# Patient Record
Sex: MALE | Race: WHITE | NOT HISPANIC OR LATINO | Employment: UNEMPLOYED | URBAN - METROPOLITAN AREA
[De-identification: names, ages, dates, MRNs, and addresses within clinical notes are randomized per-mention and may not be internally consistent; named-entity substitution may affect disease eponyms.]

---

## 2023-12-29 ENCOUNTER — APPOINTMENT (OUTPATIENT)
Dept: CT IMAGING | Facility: HOSPITAL | Age: 68
End: 2023-12-29
Payer: MEDICARE

## 2023-12-29 ENCOUNTER — APPOINTMENT (OUTPATIENT)
Dept: GENERAL RADIOLOGY | Facility: HOSPITAL | Age: 68
End: 2023-12-29
Payer: MEDICARE

## 2023-12-29 ENCOUNTER — HOSPITAL ENCOUNTER (INPATIENT)
Facility: HOSPITAL | Age: 68
LOS: 5 days | Discharge: HOME | End: 2024-01-04
Attending: EMERGENCY MEDICINE | Admitting: INTERNAL MEDICINE
Payer: MEDICARE

## 2023-12-29 DIAGNOSIS — E83.42 HYPOMAGNESEMIA: ICD-10-CM

## 2023-12-29 DIAGNOSIS — N50.89 SCROTAL EDEMA: ICD-10-CM

## 2023-12-29 DIAGNOSIS — R09.02 HYPOXIA: ICD-10-CM

## 2023-12-29 DIAGNOSIS — Z86.39 HISTORY OF OBESITY: ICD-10-CM

## 2023-12-29 DIAGNOSIS — R06.09 EXERTIONAL DYSPNEA: ICD-10-CM

## 2023-12-29 DIAGNOSIS — I10 ELEVATED BLOOD PRESSURE READING WITH DIAGNOSIS OF HYPERTENSION: ICD-10-CM

## 2023-12-29 DIAGNOSIS — Z91.199 H/O NONCOMPLIANCE WITH MEDICAL TREATMENT, PRESENTING HAZARDS TO HEALTH: ICD-10-CM

## 2023-12-29 DIAGNOSIS — E87.6 HYPOKALEMIA: ICD-10-CM

## 2023-12-29 DIAGNOSIS — I50.9 ACUTE ON CHRONIC CONGESTIVE HEART FAILURE, UNSPECIFIED HEART FAILURE TYPE: Primary | ICD-10-CM

## 2023-12-29 DIAGNOSIS — R60.0 PEDAL EDEMA: ICD-10-CM

## 2023-12-29 PROBLEM — E87.70 FLUID OVERLOAD, UNSPECIFIED: Status: ACTIVE | Noted: 2023-12-29

## 2023-12-29 LAB
ALBUMIN SERPL-MCNC: 3 G/DL (ref 3.5–5.2)
ALBUMIN/GLOB SERPL: 0.8 G/DL
ALP SERPL-CCNC: 103 U/L (ref 39–117)
ALT SERPL W P-5'-P-CCNC: 10 U/L (ref 1–41)
AMMONIA BLD-SCNC: 30 UMOL/L (ref 16–60)
AMPHET+METHAMPHET UR QL: NEGATIVE
AMPHETAMINES UR QL: NEGATIVE
ANION GAP SERPL CALCULATED.3IONS-SCNC: 8 MMOL/L (ref 5–15)
ARTERIAL PATENCY WRIST A: ABNORMAL
AST SERPL-CCNC: 27 U/L (ref 1–40)
ATMOSPHERIC PRESS: ABNORMAL MM[HG]
BARBITURATES UR QL SCN: NEGATIVE
BASE EXCESS BLDA CALC-SCNC: 10.5 MMOL/L (ref 0–2)
BASOPHILS # BLD AUTO: 0.05 10*3/MM3 (ref 0–0.2)
BASOPHILS NFR BLD AUTO: 0.4 % (ref 0–1.5)
BDY SITE: ABNORMAL
BENZODIAZ UR QL SCN: NEGATIVE
BILIRUB SERPL-MCNC: 1.1 MG/DL (ref 0–1.2)
BILIRUB UR QL STRIP: NEGATIVE
BODY TEMPERATURE: 37
BUN SERPL-MCNC: 11 MG/DL (ref 8–23)
BUN/CREAT SERPL: 12.2 (ref 7–25)
BUPRENORPHINE SERPL-MCNC: NEGATIVE NG/ML
CALCIUM SPEC-SCNC: 8.7 MG/DL (ref 8.6–10.5)
CANNABINOIDS SERPL QL: NEGATIVE
CHLORIDE SERPL-SCNC: 94 MMOL/L (ref 98–107)
CLARITY UR: CLEAR
CO2 BLDA-SCNC: 37.7 MMOL/L (ref 22–33)
CO2 SERPL-SCNC: 33 MMOL/L (ref 22–29)
COCAINE UR QL: NEGATIVE
COHGB MFR BLD: 4.3 % (ref 0–2)
COLOR UR: YELLOW
CREAT SERPL-MCNC: 0.9 MG/DL (ref 0.76–1.27)
D-LACTATE SERPL-SCNC: 1.6 MMOL/L (ref 0.5–2)
DEPRECATED RDW RBC AUTO: 44 FL (ref 37–54)
EGFRCR SERPLBLD CKD-EPI 2021: 93 ML/MIN/1.73
EOSINOPHIL # BLD AUTO: 0.32 10*3/MM3 (ref 0–0.4)
EOSINOPHIL NFR BLD AUTO: 2.4 % (ref 0.3–6.2)
EPAP: 0
ERYTHROCYTE [DISTWIDTH] IN BLOOD BY AUTOMATED COUNT: 13.7 % (ref 12.3–15.4)
FENTANYL UR-MCNC: NEGATIVE NG/ML
FLUAV RNA RESP QL NAA+PROBE: NOT DETECTED
FLUBV RNA RESP QL NAA+PROBE: NOT DETECTED
GLOBULIN UR ELPH-MCNC: 3.6 GM/DL
GLUCOSE SERPL-MCNC: 121 MG/DL (ref 65–99)
GLUCOSE UR STRIP-MCNC: NEGATIVE MG/DL
HCO3 BLDA-SCNC: 36.2 MMOL/L (ref 20–26)
HCT VFR BLD AUTO: 43.4 % (ref 37.5–51)
HCT VFR BLD CALC: 41.3 % (ref 38–51)
HGB BLD-MCNC: 14.3 G/DL (ref 13–17.7)
HGB BLDA-MCNC: 13.5 G/DL (ref 13.5–17.5)
HGB UR QL STRIP.AUTO: ABNORMAL
IMM GRANULOCYTES # BLD AUTO: 0.07 10*3/MM3 (ref 0–0.05)
IMM GRANULOCYTES NFR BLD AUTO: 0.5 % (ref 0–0.5)
INHALED O2 CONCENTRATION: 28 %
IPAP: 0
KETONES UR QL STRIP: NEGATIVE
LEUKOCYTE ESTERASE UR QL STRIP.AUTO: NEGATIVE
LYMPHOCYTES # BLD AUTO: 1.14 10*3/MM3 (ref 0.7–3.1)
LYMPHOCYTES NFR BLD AUTO: 8.6 % (ref 19.6–45.3)
MAGNESIUM SERPL-MCNC: 1.4 MG/DL (ref 1.6–2.4)
MCH RBC QN AUTO: 28.8 PG (ref 26.6–33)
MCHC RBC AUTO-ENTMCNC: 32.9 G/DL (ref 31.5–35.7)
MCV RBC AUTO: 87.5 FL (ref 79–97)
METHADONE UR QL SCN: NEGATIVE
METHGB BLD QL: 0.1 % (ref 0–1.5)
MODALITY: ABNORMAL
MONOCYTES # BLD AUTO: 0.85 10*3/MM3 (ref 0.1–0.9)
MONOCYTES NFR BLD AUTO: 6.4 % (ref 5–12)
NEUTROPHILS NFR BLD AUTO: 10.89 10*3/MM3 (ref 1.7–7)
NEUTROPHILS NFR BLD AUTO: 81.7 % (ref 42.7–76)
NITRITE UR QL STRIP: NEGATIVE
NRBC BLD AUTO-RTO: 0 /100 WBC (ref 0–0.2)
NT-PROBNP SERPL-MCNC: 2572 PG/ML (ref 0–900)
OPIATES UR QL: NEGATIVE
OXYCODONE UR QL SCN: NEGATIVE
OXYHGB MFR BLDV: 93.1 % (ref 94–99)
PAW @ PEAK INSP FLOW SETTING VENT: 0 CMH2O
PCO2 BLDA: 51 MM HG (ref 35–45)
PCO2 TEMP ADJ BLD: 51 MM HG (ref 35–48)
PCP UR QL SCN: NEGATIVE
PH BLDA: 7.46 PH UNITS (ref 7.35–7.45)
PH UR STRIP.AUTO: 7 [PH] (ref 5–8)
PH, TEMP CORRECTED: 7.46 PH UNITS
PLATELET # BLD AUTO: 326 10*3/MM3 (ref 140–450)
PMV BLD AUTO: 9.1 FL (ref 6–12)
PO2 BLDA: 85.8 MM HG (ref 83–108)
PO2 TEMP ADJ BLD: 85.8 MM HG (ref 83–108)
POTASSIUM SERPL-SCNC: 2.9 MMOL/L (ref 3.5–5.2)
PROCALCITONIN SERPL-MCNC: 0.07 NG/ML (ref 0–0.25)
PROT SERPL-MCNC: 6.6 G/DL (ref 6–8.5)
PROT UR QL STRIP: ABNORMAL
RBC # BLD AUTO: 4.96 10*6/MM3 (ref 4.14–5.8)
SARS-COV-2 RNA RESP QL NAA+PROBE: NOT DETECTED
SODIUM SERPL-SCNC: 135 MMOL/L (ref 136–145)
SP GR UR STRIP: 1.01 (ref 1–1.03)
TOTAL RATE: 0 BREATHS/MINUTE
TRICYCLICS UR QL SCN: NEGATIVE
TROPONIN T SERPL HS-MCNC: 31 NG/L
UROBILINOGEN UR QL STRIP: ABNORMAL
WBC NRBC COR # BLD AUTO: 13.32 10*3/MM3 (ref 3.4–10.8)

## 2023-12-29 PROCEDURE — 83605 ASSAY OF LACTIC ACID: CPT | Performed by: PHYSICIAN ASSISTANT

## 2023-12-29 PROCEDURE — 25010000002 FUROSEMIDE PER 20 MG: Performed by: PHYSICIAN ASSISTANT

## 2023-12-29 PROCEDURE — 83880 ASSAY OF NATRIURETIC PEPTIDE: CPT | Performed by: PHYSICIAN ASSISTANT

## 2023-12-29 PROCEDURE — 70450 CT HEAD/BRAIN W/O DYE: CPT

## 2023-12-29 PROCEDURE — 82140 ASSAY OF AMMONIA: CPT | Performed by: PHYSICIAN ASSISTANT

## 2023-12-29 PROCEDURE — 80307 DRUG TEST PRSMV CHEM ANLYZR: CPT | Performed by: PHYSICIAN ASSISTANT

## 2023-12-29 PROCEDURE — 87636 SARSCOV2 & INF A&B AMP PRB: CPT | Performed by: PHYSICIAN ASSISTANT

## 2023-12-29 PROCEDURE — 82805 BLOOD GASES W/O2 SATURATION: CPT

## 2023-12-29 PROCEDURE — 85025 COMPLETE CBC W/AUTO DIFF WBC: CPT | Performed by: PHYSICIAN ASSISTANT

## 2023-12-29 PROCEDURE — 83735 ASSAY OF MAGNESIUM: CPT | Performed by: PHYSICIAN ASSISTANT

## 2023-12-29 PROCEDURE — 83050 HGB METHEMOGLOBIN QUAN: CPT

## 2023-12-29 PROCEDURE — G0378 HOSPITAL OBSERVATION PER HR: HCPCS

## 2023-12-29 PROCEDURE — 25010000002 MAGNESIUM SULFATE 2 GM/50ML SOLUTION: Performed by: EMERGENCY MEDICINE

## 2023-12-29 PROCEDURE — 84484 ASSAY OF TROPONIN QUANT: CPT | Performed by: PHYSICIAN ASSISTANT

## 2023-12-29 PROCEDURE — 74176 CT ABD & PELVIS W/O CONTRAST: CPT

## 2023-12-29 PROCEDURE — 36600 WITHDRAWAL OF ARTERIAL BLOOD: CPT

## 2023-12-29 PROCEDURE — 84145 PROCALCITONIN (PCT): CPT | Performed by: PHYSICIAN ASSISTANT

## 2023-12-29 PROCEDURE — 80053 COMPREHEN METABOLIC PANEL: CPT | Performed by: PHYSICIAN ASSISTANT

## 2023-12-29 PROCEDURE — 93005 ELECTROCARDIOGRAM TRACING: CPT | Performed by: PHYSICIAN ASSISTANT

## 2023-12-29 PROCEDURE — 82375 ASSAY CARBOXYHB QUANT: CPT

## 2023-12-29 PROCEDURE — 99285 EMERGENCY DEPT VISIT HI MDM: CPT

## 2023-12-29 PROCEDURE — 81001 URINALYSIS AUTO W/SCOPE: CPT | Performed by: PHYSICIAN ASSISTANT

## 2023-12-29 PROCEDURE — 71045 X-RAY EXAM CHEST 1 VIEW: CPT

## 2023-12-29 RX ORDER — SODIUM CHLORIDE 0.9 % (FLUSH) 0.9 %
10 SYRINGE (ML) INJECTION EVERY 12 HOURS SCHEDULED
Status: DISCONTINUED | OUTPATIENT
Start: 2023-12-29 | End: 2024-01-04 | Stop reason: HOSPADM

## 2023-12-29 RX ORDER — NITROGLYCERIN 0.4 MG/1
0.4 TABLET SUBLINGUAL
Status: DISCONTINUED | OUTPATIENT
Start: 2023-12-29 | End: 2024-01-04 | Stop reason: HOSPADM

## 2023-12-29 RX ORDER — MAGNESIUM SULFATE HEPTAHYDRATE 40 MG/ML
2 INJECTION, SOLUTION INTRAVENOUS
Status: COMPLETED | OUTPATIENT
Start: 2023-12-29 | End: 2023-12-30

## 2023-12-29 RX ORDER — HEPARIN SODIUM 5000 [USP'U]/ML
5000 INJECTION, SOLUTION INTRAVENOUS; SUBCUTANEOUS EVERY 8 HOURS SCHEDULED
Status: DISCONTINUED | OUTPATIENT
Start: 2023-12-29 | End: 2024-01-04 | Stop reason: HOSPADM

## 2023-12-29 RX ORDER — VANCOMYCIN HYDROCHLORIDE 1 G/200ML
7.6 INJECTION, SOLUTION INTRAVENOUS EVERY 12 HOURS
Status: DISCONTINUED | OUTPATIENT
Start: 2023-12-30 | End: 2023-12-29

## 2023-12-29 RX ORDER — SODIUM CHLORIDE 9 MG/ML
40 INJECTION, SOLUTION INTRAVENOUS AS NEEDED
Status: DISCONTINUED | OUTPATIENT
Start: 2023-12-29 | End: 2024-01-04 | Stop reason: HOSPADM

## 2023-12-29 RX ORDER — TRAMADOL HYDROCHLORIDE 50 MG/1
50 TABLET ORAL EVERY 6 HOURS PRN
Status: DISCONTINUED | OUTPATIENT
Start: 2023-12-29 | End: 2024-01-04 | Stop reason: HOSPADM

## 2023-12-29 RX ORDER — POTASSIUM CHLORIDE 20 MEQ/1
40 TABLET, EXTENDED RELEASE ORAL EVERY 4 HOURS
Status: COMPLETED | OUTPATIENT
Start: 2023-12-29 | End: 2023-12-30

## 2023-12-29 RX ORDER — MORPHINE SULFATE 2 MG/ML
1 INJECTION, SOLUTION INTRAMUSCULAR; INTRAVENOUS EVERY 4 HOURS PRN
Status: DISCONTINUED | OUTPATIENT
Start: 2023-12-29 | End: 2023-12-30

## 2023-12-29 RX ORDER — VANCOMYCIN HYDROCHLORIDE 1 G/200ML
7.6 INJECTION, SOLUTION INTRAVENOUS EVERY 12 HOURS
Status: DISCONTINUED | OUTPATIENT
Start: 2023-12-30 | End: 2023-12-30

## 2023-12-29 RX ORDER — NALOXONE HCL 0.4 MG/ML
0.4 VIAL (ML) INJECTION
Status: DISCONTINUED | OUTPATIENT
Start: 2023-12-29 | End: 2023-12-30

## 2023-12-29 RX ORDER — FUROSEMIDE 10 MG/ML
80 INJECTION INTRAMUSCULAR; INTRAVENOUS ONCE
Status: COMPLETED | OUTPATIENT
Start: 2023-12-29 | End: 2023-12-29

## 2023-12-29 RX ORDER — FUROSEMIDE 10 MG/ML
20 INJECTION INTRAMUSCULAR; INTRAVENOUS EVERY 12 HOURS
Status: DISCONTINUED | OUTPATIENT
Start: 2023-12-30 | End: 2024-01-04 | Stop reason: HOSPADM

## 2023-12-29 RX ORDER — SODIUM CHLORIDE 0.9 % (FLUSH) 0.9 %
10 SYRINGE (ML) INJECTION AS NEEDED
Status: DISCONTINUED | OUTPATIENT
Start: 2023-12-29 | End: 2024-01-04 | Stop reason: HOSPADM

## 2023-12-29 RX ORDER — ONDANSETRON 2 MG/ML
4 INJECTION INTRAMUSCULAR; INTRAVENOUS EVERY 6 HOURS PRN
Status: DISCONTINUED | OUTPATIENT
Start: 2023-12-29 | End: 2024-01-04 | Stop reason: HOSPADM

## 2023-12-29 RX ADMIN — FUROSEMIDE 80 MG: 10 INJECTION, SOLUTION INTRAMUSCULAR; INTRAVENOUS at 21:54

## 2023-12-29 RX ADMIN — MAGNESIUM SULFATE HEPTAHYDRATE 2 G: 2 INJECTION, SOLUTION INTRAVENOUS at 21:59

## 2023-12-29 RX ADMIN — NITROGLYCERIN 1 INCH: 20 OINTMENT TOPICAL at 22:52

## 2023-12-29 RX ADMIN — POTASSIUM CHLORIDE 40 MEQ: 1500 TABLET, EXTENDED RELEASE ORAL at 22:52

## 2023-12-29 NOTE — Clinical Note
Level of Care: Telemetry [5]   Diagnosis: Fluid overload, unspecified [[id number]]   Admitting Physician: KEMI DILLARD III [[id number]]   Attending Physician: KEMI DILLARD III [[id number]]   Bed Request Comments: tele obs (not CDU)

## 2023-12-30 ENCOUNTER — APPOINTMENT (OUTPATIENT)
Dept: CARDIOLOGY | Facility: HOSPITAL | Age: 68
End: 2023-12-30
Payer: MEDICARE

## 2023-12-30 PROBLEM — R18.8 ASCITES: Chronic | Status: ACTIVE | Noted: 2023-12-30

## 2023-12-30 PROBLEM — K76.9 LIVER DISEASE: Chronic | Status: ACTIVE | Noted: 2023-12-30

## 2023-12-30 PROBLEM — J96.01 ACUTE RESPIRATORY FAILURE WITH HYPOXIA: Status: ACTIVE | Noted: 2023-12-30

## 2023-12-30 PROBLEM — I16.0 HYPERTENSIVE URGENCY: Status: ACTIVE | Noted: 2023-12-30

## 2023-12-30 PROBLEM — L03.90 CELLULITIS: Status: ACTIVE | Noted: 2023-12-30

## 2023-12-30 LAB
ALBUMIN SERPL-MCNC: 2.5 G/DL (ref 3.5–5.2)
ALBUMIN/GLOB SERPL: 0.9 G/DL
ALP SERPL-CCNC: 81 U/L (ref 39–117)
ALT SERPL W P-5'-P-CCNC: 9 U/L (ref 1–41)
ANION GAP SERPL CALCULATED.3IONS-SCNC: 9 MMOL/L (ref 5–15)
AST SERPL-CCNC: 27 U/L (ref 1–40)
BACTERIA UR QL AUTO: NORMAL /HPF
BASOPHILS # BLD AUTO: 0.06 10*3/MM3 (ref 0–0.2)
BASOPHILS NFR BLD AUTO: 0.6 % (ref 0–1.5)
BH CV ECHO MEAS - AO MAX PG: 18.5 MMHG
BH CV ECHO MEAS - AO MEAN PG: 10.5 MMHG
BH CV ECHO MEAS - AO ROOT DIAM: 3.2 CM
BH CV ECHO MEAS - AO V2 MAX: 214.5 CM/SEC
BH CV ECHO MEAS - AO V2 VTI: 52.9 CM
BH CV ECHO MEAS - AVA(I,D): 2.19 CM2
BH CV ECHO MEAS - EDV(CUBED): 132.7 ML
BH CV ECHO MEAS - EDV(MOD-SP2): 165 ML
BH CV ECHO MEAS - EDV(MOD-SP4): 131 ML
BH CV ECHO MEAS - EF(MOD-SP2): 69.8 %
BH CV ECHO MEAS - EF(MOD-SP4): 56.6 %
BH CV ECHO MEAS - ESV(CUBED): 24.4 ML
BH CV ECHO MEAS - ESV(MOD-SP2): 49.9 ML
BH CV ECHO MEAS - ESV(MOD-SP4): 56.9 ML
BH CV ECHO MEAS - FS: 43.1 %
BH CV ECHO MEAS - IVS/LVPW: 1 CM
BH CV ECHO MEAS - IVSD: 1.4 CM
BH CV ECHO MEAS - LA DIMENSION: 4.8 CM
BH CV ECHO MEAS - LAT PEAK E' VEL: 11.3 CM/SEC
BH CV ECHO MEAS - LV MASS(C)D: 300.4 GRAMS
BH CV ECHO MEAS - LV MAX PG: 6.6 MMHG
BH CV ECHO MEAS - LV MEAN PG: 4 MMHG
BH CV ECHO MEAS - LV V1 MAX: 128 CM/SEC
BH CV ECHO MEAS - LV V1 VTI: 33.5 CM
BH CV ECHO MEAS - LVIDD: 5.1 CM
BH CV ECHO MEAS - LVIDS: 2.9 CM
BH CV ECHO MEAS - LVOT AREA: 3.5 CM2
BH CV ECHO MEAS - LVOT DIAM: 2.1 CM
BH CV ECHO MEAS - LVPWD: 1.4 CM
BH CV ECHO MEAS - MED PEAK E' VEL: 8.5 CM/SEC
BH CV ECHO MEAS - MV A MAX VEL: 62.4 CM/SEC
BH CV ECHO MEAS - MV DEC SLOPE: 359 CM/SEC2
BH CV ECHO MEAS - MV DEC TIME: 0.28 SEC
BH CV ECHO MEAS - MV E MAX VEL: 100 CM/SEC
BH CV ECHO MEAS - MV E/A: 1.6
BH CV ECHO MEAS - MV MAX PG: 9.9 MMHG
BH CV ECHO MEAS - MV MEAN PG: 2 MMHG
BH CV ECHO MEAS - MV V2 VTI: 36.8 CM
BH CV ECHO MEAS - MVA(VTI): 3.2 CM2
BH CV ECHO MEAS - PA ACC TIME: 0.15 SEC
BH CV ECHO MEAS - PA V2 MAX: 95.1 CM/SEC
BH CV ECHO MEAS - RAP SYSTOLE: 8 MMHG
BH CV ECHO MEAS - RVSP: 64 MMHG
BH CV ECHO MEAS - SV(LVOT): 116 ML
BH CV ECHO MEAS - SV(MOD-SP2): 115.1 ML
BH CV ECHO MEAS - SV(MOD-SP4): 74.1 ML
BH CV ECHO MEAS - TAPSE (>1.6): 2.8 CM
BH CV ECHO MEAS - TR MAX PG: 56.3 MMHG
BH CV ECHO MEAS - TR MAX VEL: 375 CM/SEC
BH CV ECHO MEASUREMENTS AVERAGE E/E' RATIO: 10.1
BH CV VAS BP RIGHT ARM: NORMAL MMHG
BH CV XLRA - RV BASE: 4.3 CM
BH CV XLRA - RV LENGTH: 7.2 CM
BH CV XLRA - RV MID: 4.1 CM
BH CV XLRA - TDI S': 15.7 CM/SEC
BILIRUB SERPL-MCNC: 0.9 MG/DL (ref 0–1.2)
BUN SERPL-MCNC: 10 MG/DL (ref 8–23)
BUN/CREAT SERPL: 11.8 (ref 7–25)
CALCIUM SPEC-SCNC: 8 MG/DL (ref 8.6–10.5)
CHLORIDE SERPL-SCNC: 97 MMOL/L (ref 98–107)
CHOLEST SERPL-MCNC: 107 MG/DL (ref 0–200)
CO2 SERPL-SCNC: 33 MMOL/L (ref 22–29)
CREAT SERPL-MCNC: 0.85 MG/DL (ref 0.76–1.27)
DEPRECATED RDW RBC AUTO: 43.3 FL (ref 37–54)
EGFRCR SERPLBLD CKD-EPI 2021: 94.6 ML/MIN/1.73
EOSINOPHIL # BLD AUTO: 0.35 10*3/MM3 (ref 0–0.4)
EOSINOPHIL NFR BLD AUTO: 3.5 % (ref 0.3–6.2)
ERYTHROCYTE [DISTWIDTH] IN BLOOD BY AUTOMATED COUNT: 13.8 % (ref 12.3–15.4)
GEN 5 2HR TROPONIN T REFLEX: 40 NG/L
GLOBULIN UR ELPH-MCNC: 2.9 GM/DL
GLUCOSE SERPL-MCNC: 106 MG/DL (ref 65–99)
HBA1C MFR BLD: 5.1 % (ref 4.8–5.6)
HCT VFR BLD AUTO: 36.8 % (ref 37.5–51)
HDLC SERPL-MCNC: 47 MG/DL (ref 40–60)
HGB BLD-MCNC: 12.2 G/DL (ref 13–17.7)
HYALINE CASTS UR QL AUTO: NORMAL /LPF
IMM GRANULOCYTES # BLD AUTO: 0.05 10*3/MM3 (ref 0–0.05)
IMM GRANULOCYTES NFR BLD AUTO: 0.5 % (ref 0–0.5)
INR PPP: 1.1 (ref 0.89–1.12)
LDLC SERPL CALC-MCNC: 47 MG/DL (ref 0–100)
LDLC/HDLC SERPL: 1.03 {RATIO}
LEFT ATRIUM VOLUME INDEX: 37.6 ML/M2
LYMPHOCYTES # BLD AUTO: 1.39 10*3/MM3 (ref 0.7–3.1)
LYMPHOCYTES NFR BLD AUTO: 13.8 % (ref 19.6–45.3)
MAGNESIUM SERPL-MCNC: 2.2 MG/DL (ref 1.6–2.4)
MCH RBC QN AUTO: 28.5 PG (ref 26.6–33)
MCHC RBC AUTO-ENTMCNC: 33.2 G/DL (ref 31.5–35.7)
MCV RBC AUTO: 86 FL (ref 79–97)
MONOCYTES # BLD AUTO: 0.74 10*3/MM3 (ref 0.1–0.9)
MONOCYTES NFR BLD AUTO: 7.4 % (ref 5–12)
MRSA DNA SPEC QL NAA+PROBE: NEGATIVE
NEUTROPHILS NFR BLD AUTO: 7.46 10*3/MM3 (ref 1.7–7)
NEUTROPHILS NFR BLD AUTO: 74.2 % (ref 42.7–76)
NRBC BLD AUTO-RTO: 0 /100 WBC (ref 0–0.2)
PLATELET # BLD AUTO: 301 10*3/MM3 (ref 140–450)
PMV BLD AUTO: 9.8 FL (ref 6–12)
POTASSIUM SERPL-SCNC: 3.1 MMOL/L (ref 3.5–5.2)
POTASSIUM SERPL-SCNC: 3.1 MMOL/L (ref 3.5–5.2)
POTASSIUM SERPL-SCNC: 3.4 MMOL/L (ref 3.5–5.2)
PROT SERPL-MCNC: 5.4 G/DL (ref 6–8.5)
PROTHROMBIN TIME: 14.3 SECONDS (ref 12.2–14.5)
QT INTERVAL: 414 MS
QT INTERVAL: 504 MS
QTC INTERVAL: 509 MS
QTC INTERVAL: 574 MS
RBC # BLD AUTO: 4.28 10*6/MM3 (ref 4.14–5.8)
RBC # UR STRIP: NORMAL /HPF
REF LAB TEST METHOD: NORMAL
SODIUM SERPL-SCNC: 139 MMOL/L (ref 136–145)
SQUAMOUS #/AREA URNS HPF: NORMAL /HPF
TRIGL SERPL-MCNC: 59 MG/DL (ref 0–150)
TROPONIN T DELTA: 5 NG/L
TROPONIN T SERPL HS-MCNC: 35 NG/L
TSH SERPL DL<=0.05 MIU/L-ACNC: 2.36 UIU/ML (ref 0.27–4.2)
VLDLC SERPL-MCNC: 13 MG/DL (ref 5–40)
WBC # UR STRIP: NORMAL /HPF
WBC NRBC COR # BLD AUTO: 10.05 10*3/MM3 (ref 3.4–10.8)

## 2023-12-30 PROCEDURE — 25010000002 MAGNESIUM SULFATE 2 GM/50ML SOLUTION: Performed by: EMERGENCY MEDICINE

## 2023-12-30 PROCEDURE — 80061 LIPID PANEL: CPT | Performed by: INTERNAL MEDICINE

## 2023-12-30 PROCEDURE — 85610 PROTHROMBIN TIME: CPT | Performed by: NURSE PRACTITIONER

## 2023-12-30 PROCEDURE — 93306 TTE W/DOPPLER COMPLETE: CPT

## 2023-12-30 PROCEDURE — 25010000002 PIPERACILLIN SOD-TAZOBACTAM PER 1 G: Performed by: INTERNAL MEDICINE

## 2023-12-30 PROCEDURE — 87641 MR-STAPH DNA AMP PROBE: CPT

## 2023-12-30 PROCEDURE — 87040 BLOOD CULTURE FOR BACTERIA: CPT | Performed by: INTERNAL MEDICINE

## 2023-12-30 PROCEDURE — 25010000002 PIPERACILLIN SOD-TAZOBACTAM PER 1 G

## 2023-12-30 PROCEDURE — 83036 HEMOGLOBIN GLYCOSYLATED A1C: CPT | Performed by: INTERNAL MEDICINE

## 2023-12-30 PROCEDURE — 93306 TTE W/DOPPLER COMPLETE: CPT | Performed by: INTERNAL MEDICINE

## 2023-12-30 PROCEDURE — 99223 1ST HOSP IP/OBS HIGH 75: CPT | Performed by: NURSE PRACTITIONER

## 2023-12-30 PROCEDURE — 25010000002 CEFAZOLIN PER 500 MG: Performed by: FAMILY MEDICINE

## 2023-12-30 PROCEDURE — 85025 COMPLETE CBC W/AUTO DIFF WBC: CPT | Performed by: INTERNAL MEDICINE

## 2023-12-30 PROCEDURE — 84484 ASSAY OF TROPONIN QUANT: CPT | Performed by: INTERNAL MEDICINE

## 2023-12-30 PROCEDURE — 25810000003 SODIUM CHLORIDE 0.9 % SOLUTION

## 2023-12-30 PROCEDURE — 84403 ASSAY OF TOTAL TESTOSTERONE: CPT | Performed by: NURSE PRACTITIONER

## 2023-12-30 PROCEDURE — 25010000002 THIAMINE PER 100 MG: Performed by: NURSE PRACTITIONER

## 2023-12-30 PROCEDURE — 80053 COMPREHEN METABOLIC PANEL: CPT | Performed by: INTERNAL MEDICINE

## 2023-12-30 PROCEDURE — 83735 ASSAY OF MAGNESIUM: CPT | Performed by: INTERNAL MEDICINE

## 2023-12-30 PROCEDURE — 84443 ASSAY THYROID STIM HORMONE: CPT | Performed by: INTERNAL MEDICINE

## 2023-12-30 PROCEDURE — 84132 ASSAY OF SERUM POTASSIUM: CPT | Performed by: EMERGENCY MEDICINE

## 2023-12-30 PROCEDURE — 93005 ELECTROCARDIOGRAM TRACING: CPT | Performed by: FAMILY MEDICINE

## 2023-12-30 PROCEDURE — 84132 ASSAY OF SERUM POTASSIUM: CPT | Performed by: FAMILY MEDICINE

## 2023-12-30 PROCEDURE — 25010000002 HEPARIN (PORCINE) PER 1000 UNITS: Performed by: INTERNAL MEDICINE

## 2023-12-30 PROCEDURE — 25010000002 VANCOMYCIN 10 G RECONSTITUTED SOLUTION

## 2023-12-30 PROCEDURE — 93010 ELECTROCARDIOGRAM REPORT: CPT | Performed by: INTERNAL MEDICINE

## 2023-12-30 PROCEDURE — 25010000002 FUROSEMIDE PER 20 MG: Performed by: INTERNAL MEDICINE

## 2023-12-30 RX ORDER — FOLIC ACID 1 MG/1
1 TABLET ORAL DAILY
Status: DISCONTINUED | OUTPATIENT
Start: 2023-12-30 | End: 2024-01-04 | Stop reason: HOSPADM

## 2023-12-30 RX ORDER — POTASSIUM CHLORIDE 20 MEQ/1
40 TABLET, EXTENDED RELEASE ORAL EVERY 4 HOURS
Status: COMPLETED | OUTPATIENT
Start: 2023-12-30 | End: 2023-12-30

## 2023-12-30 RX ORDER — NICOTINE 21 MG/24HR
1 PATCH, TRANSDERMAL 24 HOURS TRANSDERMAL
Status: DISCONTINUED | OUTPATIENT
Start: 2023-12-30 | End: 2024-01-04 | Stop reason: HOSPADM

## 2023-12-30 RX ORDER — MULTIPLE VITAMINS W/ MINERALS TAB 9MG-400MCG
1 TAB ORAL DAILY
Status: DISCONTINUED | OUTPATIENT
Start: 2023-12-30 | End: 2024-01-04 | Stop reason: HOSPADM

## 2023-12-30 RX ORDER — THIAMINE HYDROCHLORIDE 100 MG/ML
100 INJECTION, SOLUTION INTRAMUSCULAR; INTRAVENOUS DAILY
Status: DISCONTINUED | OUTPATIENT
Start: 2023-12-30 | End: 2024-01-04

## 2023-12-30 RX ADMIN — Medication 1 TABLET: at 19:46

## 2023-12-30 RX ADMIN — HEPARIN SODIUM 5000 UNITS: 5000 INJECTION INTRAVENOUS; SUBCUTANEOUS at 05:21

## 2023-12-30 RX ADMIN — SODIUM CHLORIDE 2000 MG: 900 INJECTION INTRAVENOUS at 14:53

## 2023-12-30 RX ADMIN — HEPARIN SODIUM 5000 UNITS: 5000 INJECTION INTRAVENOUS; SUBCUTANEOUS at 14:53

## 2023-12-30 RX ADMIN — POTASSIUM CHLORIDE 40 MEQ: 1500 TABLET, EXTENDED RELEASE ORAL at 05:21

## 2023-12-30 RX ADMIN — MAGNESIUM SULFATE HEPTAHYDRATE 2 G: 2 INJECTION, SOLUTION INTRAVENOUS at 02:03

## 2023-12-30 RX ADMIN — PIPERACILLIN SODIUM AND TAZOBACTAM SODIUM 4.5 G: 4; .5 INJECTION, SOLUTION INTRAVENOUS at 01:01

## 2023-12-30 RX ADMIN — Medication 10 ML: at 00:28

## 2023-12-30 RX ADMIN — FUROSEMIDE 20 MG: 10 INJECTION, SOLUTION INTRAMUSCULAR; INTRAVENOUS at 17:13

## 2023-12-30 RX ADMIN — HEPARIN SODIUM 5000 UNITS: 5000 INJECTION INTRAVENOUS; SUBCUTANEOUS at 00:27

## 2023-12-30 RX ADMIN — HEPARIN SODIUM 5000 UNITS: 5000 INJECTION INTRAVENOUS; SUBCUTANEOUS at 21:20

## 2023-12-30 RX ADMIN — POTASSIUM CHLORIDE 40 MEQ: 1500 TABLET, EXTENDED RELEASE ORAL at 01:04

## 2023-12-30 RX ADMIN — POTASSIUM CHLORIDE 40 MEQ: 1500 TABLET, EXTENDED RELEASE ORAL at 17:17

## 2023-12-30 RX ADMIN — FUROSEMIDE 20 MG: 10 INJECTION, SOLUTION INTRAMUSCULAR; INTRAVENOUS at 05:21

## 2023-12-30 RX ADMIN — Medication 10 ML: at 20:46

## 2023-12-30 RX ADMIN — VANCOMYCIN HYDROCHLORIDE 2750 MG: 10 INJECTION, POWDER, LYOPHILIZED, FOR SOLUTION INTRAVENOUS at 01:01

## 2023-12-30 RX ADMIN — MAGNESIUM SULFATE HEPTAHYDRATE 2 G: 2 INJECTION, SOLUTION INTRAVENOUS at 00:26

## 2023-12-30 RX ADMIN — POTASSIUM CHLORIDE 40 MEQ: 1500 TABLET, EXTENDED RELEASE ORAL at 21:20

## 2023-12-30 RX ADMIN — THIAMINE HYDROCHLORIDE 100 MG: 100 INJECTION, SOLUTION INTRAMUSCULAR; INTRAVENOUS at 19:46

## 2023-12-30 RX ADMIN — FOLIC ACID 1 MG: 1 TABLET ORAL at 19:46

## 2023-12-30 RX ADMIN — NICOTINE 1 PATCH: 14 PATCH, EXTENDED RELEASE TRANSDERMAL at 05:25

## 2023-12-30 RX ADMIN — SODIUM CHLORIDE 2000 MG: 900 INJECTION INTRAVENOUS at 21:21

## 2023-12-30 RX ADMIN — PIPERACILLIN SODIUM AND TAZOBACTAM SODIUM 4.5 G: 4; .5 INJECTION, SOLUTION INTRAVENOUS at 05:20

## 2023-12-30 NOTE — CONSULTS
[facility]    Referring Provider: No ref. provider found    PCP: Senthil Lazar MD    Reason for Consultation: Chronic liver disease with ascites on CT, apparent new diagnosis of cirrhosis    Chief complaint: Fluid overload    History of present illness:    Camron James is a 68 y.o. male who is admitted with lower extremity edema and scrotal swelling.  GI consult received as imaging shows evidence of cirrhotic morphology of liver.  Patient does not report any prior history of hepatobiliary diseases or disorders in himself or his family; does not report any prior history of CHF either.  Reports that he is a daily drinker though volume of intake varies day-to-day.  Reports a several month onset of progressive decline with worsening weakness, fatigue, and change in appetite and sleep pattern.  Over the past week or so has had significant overload and edema particular in his lower extremities and his scrotum.  Does not endorse any N/V/D, abdominal pain, chest pain, or fever/chills.  No use of NSAIDs.  Is not anticoagulated.  CT imaging obtained at time of admission shows evidence of cirrhotic morphology of liver with collaterals and splenomegaly with sequela of portal hypertension and small volume ascites.Past medical, surgical, social, and family histories are reviewed for accuracy.  No documented alleviating or exacerbating factors.  Does not endorse pain at time of exam.    Allergies:  Patient has no known allergies.    Scheduled Meds:  ceFAZolin, 2,000 mg, Intravenous, Q8H  furosemide, 20 mg, Intravenous, Q12H  heparin (porcine), 5,000 Units, Subcutaneous, Q8H  nicotine, 1 patch, Transdermal, Q24H  potassium chloride ER, 40 mEq, Oral, Q4H  sodium chloride, 10 mL, Intravenous, Q12H         Infusions:       PRN Meds:    Calcium Replacement - Follow Nurse / BPA Driven Protocol    Magnesium Standard Dose Replacement - Follow Nurse / BPA Driven Protocol    nitroglycerin    ondansetron     "Phosphorus Replacement - Follow Nurse / BPA Driven Protocol    Potassium Replacement - Follow Nurse / BPA Driven Protocol    [COMPLETED] Insert Peripheral IV **AND** sodium chloride    sodium chloride    sodium chloride    traMADol    Home Meds:  No medications prior to admission.       ROS: Review of Systems   Constitutional:  Positive for activity change, appetite change and fatigue. Negative for chills, diaphoresis, fever and unexpected weight change.   HENT:  Negative for sore throat, trouble swallowing and voice change.    Eyes: Negative.    Respiratory:  Negative for apnea, cough, choking, chest tightness, shortness of breath, wheezing and stridor.    Cardiovascular:  Positive for leg swelling. Negative for chest pain and palpitations.   Gastrointestinal:  Positive for abdominal distention. Negative for abdominal pain, anal bleeding, blood in stool, constipation, diarrhea, nausea, rectal pain and vomiting.   Endocrine: Negative.    Genitourinary:  Positive for scrotal swelling. Negative for penile pain and testicular pain.   Musculoskeletal: Negative.    Skin: Negative.    Allergic/Immunologic: Negative.    Neurological: Negative.    Hematological: Negative.    Psychiatric/Behavioral: Negative.     All other systems reviewed and are negative.      PAST MED HX:  Past Medical History:   Diagnosis Date    CHF (congestive heart failure)     Elevated cholesterol     Hypertension        PAST SURG HX:  History reviewed. No pertinent surgical history.    FAM HX:  Family History   Family history unknown: Yes       SOC HX:  Social History     Socioeconomic History    Marital status:    Tobacco Use    Smoking status: Former     Types: Cigarettes    Smokeless tobacco: Never   Vaping Use    Vaping Use: Never used   Substance and Sexual Activity    Alcohol use: Defer    Drug use: Defer    Sexual activity: Defer       PHYSICAL EXAM  /79   Pulse 68   Temp 98.8 °F (37.1 °C) (Oral)   Resp 16   Ht 180.3 cm (71\") "   Wt (!) 143 kg (315 lb 14.4 oz)   SpO2 96%   BMI 44.06 kg/m²   Wt Readings from Last 3 Encounters:   12/29/23 (!) 143 kg (315 lb 14.4 oz)   ,body mass index is 44.06 kg/m².  Physical Exam  Vitals and nursing note reviewed.   Constitutional:       General: He is not in acute distress.     Appearance: Normal appearance. He is obese. He is ill-appearing. He is not toxic-appearing.   HENT:      Head: Normocephalic and atraumatic.   Eyes:      General: No scleral icterus.     Extraocular Movements: Extraocular movements intact.      Conjunctiva/sclera: Conjunctivae normal.      Pupils: Pupils are equal, round, and reactive to light.   Cardiovascular:      Rate and Rhythm: Normal rate and regular rhythm.      Pulses: Normal pulses.      Heart sounds: Normal heart sounds.   Pulmonary:      Effort: Pulmonary effort is normal.      Breath sounds: Normal breath sounds.   Abdominal:      General: Abdomen is flat. Bowel sounds are normal. There is no distension.      Palpations: Abdomen is soft. There is no mass.      Tenderness: There is no abdominal tenderness. There is no guarding or rebound.      Hernia: No hernia is present.   Musculoskeletal:      Right lower leg: Edema present.      Left lower leg: Edema present.      Comments: Lower extremity cellulitis appreciated   Skin:     General: Skin is warm and dry.      Capillary Refill: Capillary refill takes less than 2 seconds.   Neurological:      General: No focal deficit present.      Mental Status: He is alert and oriented to person, place, and time.   Psychiatric:         Mood and Affect: Mood normal.         Behavior: Behavior normal.         Thought Content: Thought content normal.         Judgment: Judgment normal.         Results Review:   I reviewed the patient's new clinical results.  I reviewed the patient's new imaging results and agree with the interpretation.  I reviewed the patient's other test results and agree with the interpretation  I personally  viewed and interpreted the patient's EKG/Telemetry data    Lab Results   Component Value Date    WBC 10.05 12/30/2023    HGB 12.2 (L) 12/30/2023    HGB 14.3 12/29/2023    HCT 36.8 (L) 12/30/2023    MCV 86.0 12/30/2023     12/30/2023       Lab Results   Component Value Date    INR 1.10 12/30/2023       Lab Results   Component Value Date    GLUCOSE 106 (H) 12/30/2023    BUN 10 12/30/2023    CREATININE 0.85 12/30/2023    BCR 11.8 12/30/2023     12/30/2023    K 3.4 (L) 12/30/2023    CO2 33.0 (H) 12/30/2023    CALCIUM 8.0 (L) 12/30/2023    ALBUMIN 2.5 (L) 12/30/2023    ALKPHOS 81 12/30/2023    BILITOT 0.9 12/30/2023    ALT 9 12/30/2023    AST 27 12/30/2023       Adult Transthoracic Echo Complete w/ Color, Spectral and Contrast if necessary per protocol    Result Date: 12/30/2023    Left ventricular systolic function is normal. Left ventricular ejection fraction appears to be 56 - 60%.   Left ventricular wall thickness is consistent with mild concentric hypertrophy.   Left ventricular diastolic function was normal.   The right ventricular cavity is borderline dilated.   The left atrial cavity is mildly dilated.   The right atrial cavity is borderline dilated.   Moderate tricuspid valve regurgitation is present.   Estimated right ventricular systolic pressure from tricuspid regurgitation is markedly elevated (>55 mmHg).     XR Chest 1 View    Result Date: 12/29/2023  XR CHEST 1 VW Date of Exam: 12/29/2023 8:12 PM EST Indication: pedal edema, SOA Comparison: None available. Findings: Heart shadow is moderately enlarged. The vasculature is cephalized and there is a diffuse pulmonary edema pattern present. There may be minimal effusions. No pneumothorax or lung consolidation is seen. Advanced right shoulder joint DJD is seen with high riding humerus, suggesting chronic rotator cuff tear.     Impression: Congestive heart failure with moderate pulmonary interstitial edema. Electronically Signed: Ronn Liz MD   12/29/2023 8:29 PM EST  Workstation ID: [id number]    CT Abdomen Pelvis Without Contrast    Result Date: 12/29/2023  CT ABDOMEN PELVIS WO CONTRAST Date of Exam: 12/29/2023 7:44 PM EST Indication: Abdominal pain, acute, nonlocalized. Comparison: None available. Technique: Axial CT images were obtained of the abdomen and pelvis without the administration of contrast. Reconstructed coronal and sagittal images were also obtained. Automated exposure control and iterative construction methods were used. Findings: Lower Thorax: Heart appears enlarged. No focal consolidation. Partially-visualized left axillary and lower paraesophageal lymphadenopathy. Liver: Widening of the fissures with caudate lobe hypertrophy, which are morphologic changes seen with chronic liver disease. No evidence of focal liver lesion on this noncontrast exam Gallbladder and bile ducts: Gallbladder is unremarkable. No biliary ductal dilatation. Pancreas: No pancreatic duct dilation. No surrounding inflammation. Spleen: Enlarged measuring 15 cm in greatest dimension. Adrenal glands: No discrete adrenal nodule. Kidneys: No hydronephrosis. No nephroureterolithiasis. Small bilateral likely cysts, as imaged without contrast. Urinary bladder: Unremarkable. Reproductive Organs: Extensive scrotal edema. Stomach and Bowel: No evidence of bowel obstruction. Lymph nodes: Multiple enlarged lymph nodes throughout the abdomen and pelvis. For example an aortocaval lymph node measures 2.8 x 2.3 cm (series 2 image 85) and a right inguinal lymph node measures 3.8 x 2.3 cm (series 2 image 174). Vessels: No abdominal aortic aneurysm. Suspected upper abdominal portosystemic collaterals. Peritoneum and retroperitoneum: Small volume free fluid, some which may be loculated, for example along the left paracolic gutter and in the left pelvis. Soft tissues: Diffuse subcutaneous edema. Osseous structures: No acute or suspicious osseous lesions. Multilevel degenerative changes of  the spine. Advanced arthritis of the right hip.     Impression: Morphologic changes suggestive of chronic liver disease, with sequela of portal hypertension including upper abdominal collaterals and splenomegaly. Findings of anasarca with small volume ascites, diffuse subcutaneous edema, and significant scrotal edema. Some of the abdominopelvic fluid may be loculated, for example in the left pelvis and along the left paracolic gutter. Lymphadenopathy throughout the abdomen and pelvis, as well as the lower chest. Findings are nonspecific and can be seen with infectious, inflammatory, and neoplastic etiologies. A follow-up exam is recommended to evaluate for interval change, perhaps in 3 months, with further recommendations at that time, which may include PET/CT or tissue sampling, if these are not known findings. Above findings as imaged without contrast. Electronically Signed: Benjamin Addison MD  12/29/2023 8:17 PM EST  Workstation ID: [id number]    CT Head Without Contrast    Result Date: 12/29/2023  CT HEAD WO CONTRAST Date of Exam: 12/29/2023 7:37 PM EST Indication: hallucinations, confusion. Comparison: None available. Technique: Axial CT images were obtained of the head without contrast administration.  Automated exposure control and iterative construction methods were used. Findings: No evidence of acute intracranial hemorrhage or mass effect. No extra-axial collection. The gray white matter differentiation is preserved. Ventricles and sulci are symmetric. The mastoid air cells and paranasal sinuses are well aerated. Globes and extraocular muscles are unremarkable. No acute or suspicious osseous abnormality. Soft tissues within normal limits.     Impression: No evidence of acute intracranial abnormality. Electronically Signed: Benjamin Addison MD  12/29/2023 7:57 PM EST  Workstation ID: [id number]      COVID19   Date Value Ref Range Status   12/29/2023 Not Detected Not Detected - Ref. Range Final      ASSESSMENTS/PLANS  1.  Liver cirrhosis, suspect multifactorial secondary to elevated right heart pressures, alcohol, and fatty liver  2.  Acute respiratory failure with hypoxia, pulmonary edema  3.  Bilateral lower extremity edema with cellulitis  4.  Alcohol abuse and dependence  5.  Hypertension.    Camron James is a 68 y.o. male who presents to hospital with lower extremity edema and scrotal swelling was found to have liver cirrhosis.  Suspect etiology of cirrhosis is multifactorial given his significantly elevated right heart pressures, longstanding alcohol abuse/dependence, and likely underlying fatty liver.  From GI standpoint, will check immunity studies to hep A, hep B, AFP, vitamin D level, and testosterone level.  Will need liver ultrasound for HCC surveillance this admission.  Will need to establish with gastroenterologist and obtain outpatient EGD in future.    >>> Patient desperately needs to abstain from any further use of alcohol given his chronic liver disease noted on imaging  Would benefit from chemical dependency consult this admission  >>> thiamine, MVI, and folic acid  >>> Obtain immunity studies to hep A, hep B, AFP, vitamin D level, and testosterone level.  >>> Order placed for liver ultrasound + elastography for HCC surveillance  >>> Will need referral to gastroenterology for outpatient management as well as outpatient EGD/colonoscopy in future.  >>> Management of cellulitis per primary service.  >>> Will provide dietary and lifestyle education to patient from GI standpoint regarding liver disease before discharge.    I discussed the patient's findings and my recommendations with patient and consulting provider    Shubham Bird, LAN  12/30/23  18:31 EST

## 2023-12-30 NOTE — PROGRESS NOTES
[facility] Medicine Services  PROGRESS NOTE    Patient Name: Camron James  : 1955  MRN: [id number]    Date of Admission: 2023  Primary Care Physician: Senthil Lazar MD    Subjective   Subjective     CC:  Hypoxia    HPI:  Patient 68-year-old who presented to the emergency department with weakness shortness of breath and swelling.  He was found to have pulmonary edema, respiratory failure with hypoxia requiring now 3 L nasal cannula.  Also concerns for liver disease and ascites noted.  Started on antibiotics for cellulitis. He reports he had hallucinations yesterday  but that has resolved now.       Objective   Objective     Vital Signs:   Temp:  [98.1 °F (36.7 °C)-98.2 °F (36.8 °C)] 98.1 °F (36.7 °C)  Heart Rate:  [76-92] 80  Resp:  [16-20] 16  BP: (129-209)/() 129/63  Flow (L/min):  [2-3] 3     Physical Exam:  Constitutional: No acute distress, awake, alert  HENT: NCAT, mucous membranes moist  Respiratory: Decreased breath sounds with crackles bilaterally, respiratory effort normal   Cardiovascular: RRR  Gastrointestinal: Positive bowel sounds, soft, moderately distended  Musculoskeletal: Bilateral lower extremity edema with erythema  Psychiatric: Appropriate affect, cooperative  Neurologic: Oriented x 3, generally weak, Cranial Nerves grossly intact to confrontation, speech clear  Skin: Edema bilateral lower extremities      Results Reviewed:  LAB RESULTS:      Lab 234 23   WBC 10.05 13.32*   HEMOGLOBIN 12.2* 14.3   HEMATOCRIT 36.8* 43.4   PLATELETS 301 326   NEUTROS ABS 7.46* 10.89*   IMMATURE GRANS (ABS) 0.05 0.07*   LYMPHS ABS 1.39 1.14   MONOS ABS 0.74 0.85   EOS ABS 0.35 0.32   MCV 86.0 87.5   PROCALCITONIN  --  0.07   LACTATE  --  1.6         Lab 234 23   SODIUM 139 135*   POTASSIUM 3.1*  3.1* 2.9*   CHLORIDE 97* 94*   CO2 33.0* 33.0*   ANION GAP 9.0 8.0   BUN 10 11   CREATININE 0.85 0.90   EGFR 94.6 93.0    GLUCOSE 106* 121*   CALCIUM 8.0* 8.7   MAGNESIUM 2.2 1.4*   HEMOGLOBIN A1C 5.10  --    TSH 2.360  --          Lab 12/30/23 0414 12/29/23 2015   TOTAL PROTEIN 5.4* 6.6   ALBUMIN 2.5* 3.0*   GLOBULIN 2.9 3.6   ALT (SGPT) 9 10   AST (SGOT) 27 27   BILIRUBIN 0.9 1.1   ALK PHOS 81 103         Lab 12/30/23  0630 12/30/23  0414 12/29/23 2015   PROBNP  --   --  2,572.0*   HSTROP T 40* 35* 31*         Lab 12/30/23  0414   CHOLESTEROL 107   LDL CHOL 47   HDL CHOL 47   TRIGLYCERIDES 59             Lab 12/29/23  2146   PH, ARTERIAL 7.459*   PCO2, ARTERIAL 51.0*   PO2 ART 85.8   FIO2 28   HCO3 ART 36.2*   BASE EXCESS ART 10.5*   CARBOXYHEMOGLOBIN 4.3*     Brief Urine Lab Results  (Last result in the past 365 days)        Color   Clarity   Blood   Leuk Est   Nitrite   Protein   CREAT   Urine HCG        12/29/23 2231 Yellow   Clear   Trace   Negative   Negative   30 mg/dL (1+)                   Microbiology Results Abnormal       Procedure Component Value - Date/Time    MRSA Screen, PCR (Inpatient) - Swab, Nares [[id number]]  (Normal) Collected: 12/30/23 0136    Lab Status: Final result Specimen: Swab from Nares Updated: 12/30/23 0811     MRSA PCR Negative    Narrative:      The negative predictive value of this diagnostic test is high and should only be used to consider de-escalating anti-MRSA therapy. A positive result may indicate colonization with MRSA and must be correlated clinically.  MRSA Negative    COVID PRE-OP / PRE-PROCEDURE SCREENING ORDER (NO ISOLATION) - Swab, Nasopharynx [[id number]]  (Normal) Collected: 12/29/23 2015    Lab Status: Final result Specimen: Swab from Nasopharynx Updated: 12/29/23 2058    Narrative:      The following orders were created for panel order COVID PRE-OP / PRE-PROCEDURE SCREENING ORDER (NO ISOLATION) - Swab, Nasopharynx.  Procedure                               Abnormality         Status                     ---------                               -----------         ------                      COVID-19 and FLU A/B PCR...[[id number]]  Normal              Final result                 Please view results for these tests on the individual orders.    COVID-19 and FLU A/B PCR, 1 HR TAT - Swab, Nasopharynx [[id number]]  (Normal) Collected: 12/29/23 2015    Lab Status: Final result Specimen: Swab from Nasopharynx Updated: 12/29/23 2058     COVID19 Not Detected     Influenza A PCR Not Detected     Influenza B PCR Not Detected    Narrative:      Fact sheet for providers: https://www.fda.gov/media/597771/download    Fact sheet for patients: https://www.fda.gov/media/784436/download    Test performed by PCR.            XR Chest 1 View    Result Date: 12/29/2023  XR CHEST 1 VW Date of Exam: 12/29/2023 8:12 PM EST Indication: pedal edema, SOA Comparison: None available. Findings: Heart shadow is moderately enlarged. The vasculature is cephalized and there is a diffuse pulmonary edema pattern present. There may be minimal effusions. No pneumothorax or lung consolidation is seen. Advanced right shoulder joint DJD is seen with high riding humerus, suggesting chronic rotator cuff tear.     Impression: Impression: Congestive heart failure with moderate pulmonary interstitial edema. Electronically Signed: Ronn Liz MD  12/29/2023 8:29 PM EST  Workstation ID: [id number]    CT Abdomen Pelvis Without Contrast    Result Date: 12/29/2023  CT ABDOMEN PELVIS WO CONTRAST Date of Exam: 12/29/2023 7:44 PM EST Indication: Abdominal pain, acute, nonlocalized. Comparison: None available. Technique: Axial CT images were obtained of the abdomen and pelvis without the administration of contrast. Reconstructed coronal and sagittal images were also obtained. Automated exposure control and iterative construction methods were used. Findings: Lower Thorax: Heart appears enlarged. No focal consolidation. Partially-visualized left axillary and lower paraesophageal lymphadenopathy. Liver: Widening of the fissures with caudate lobe hypertrophy,  which are morphologic changes seen with chronic liver disease. No evidence of focal liver lesion on this noncontrast exam Gallbladder and bile ducts: Gallbladder is unremarkable. No biliary ductal dilatation. Pancreas: No pancreatic duct dilation. No surrounding inflammation. Spleen: Enlarged measuring 15 cm in greatest dimension. Adrenal glands: No discrete adrenal nodule. Kidneys: No hydronephrosis. No nephroureterolithiasis. Small bilateral likely cysts, as imaged without contrast. Urinary bladder: Unremarkable. Reproductive Organs: Extensive scrotal edema. Stomach and Bowel: No evidence of bowel obstruction. Lymph nodes: Multiple enlarged lymph nodes throughout the abdomen and pelvis. For example an aortocaval lymph node measures 2.8 x 2.3 cm (series 2 image 85) and a right inguinal lymph node measures 3.8 x 2.3 cm (series 2 image 174). Vessels: No abdominal aortic aneurysm. Suspected upper abdominal portosystemic collaterals. Peritoneum and retroperitoneum: Small volume free fluid, some which may be loculated, for example along the left paracolic gutter and in the left pelvis. Soft tissues: Diffuse subcutaneous edema. Osseous structures: No acute or suspicious osseous lesions. Multilevel degenerative changes of the spine. Advanced arthritis of the right hip.     Impression: Impression: Morphologic changes suggestive of chronic liver disease, with sequela of portal hypertension including upper abdominal collaterals and splenomegaly. Findings of anasarca with small volume ascites, diffuse subcutaneous edema, and significant scrotal edema. Some of the abdominopelvic fluid may be loculated, for example in the left pelvis and along the left paracolic gutter. Lymphadenopathy throughout the abdomen and pelvis, as well as the lower chest. Findings are nonspecific and can be seen with infectious, inflammatory, and neoplastic etiologies. A follow-up exam is recommended to evaluate for interval change, perhaps in 3  months, with further recommendations at that time, which may include PET/CT or tissue sampling, if these are not known findings. Above findings as imaged without contrast. Electronically Signed: Benjamin Addison MD  12/29/2023 8:17 PM EST  Workstation ID: [id number]    CT Head Without Contrast    Result Date: 12/29/2023  CT HEAD WO CONTRAST Date of Exam: 12/29/2023 7:37 PM EST Indication: hallucinations, confusion. Comparison: None available. Technique: Axial CT images were obtained of the head without contrast administration.  Automated exposure control and iterative construction methods were used. Findings: No evidence of acute intracranial hemorrhage or mass effect. No extra-axial collection. The gray white matter differentiation is preserved. Ventricles and sulci are symmetric. The mastoid air cells and paranasal sinuses are well aerated. Globes and extraocular muscles are unremarkable. No acute or suspicious osseous abnormality. Soft tissues within normal limits.     Impression: Impression: No evidence of acute intracranial abnormality. Electronically Signed: Benjamin Addison MD  12/29/2023 7:57 PM EST  Workstation ID: [id number]         Current medications:  Scheduled Meds:furosemide, 20 mg, Intravenous, Q12H  heparin (porcine), 5,000 Units, Subcutaneous, Q8H  nicotine, 1 patch, Transdermal, Q24H  piperacillin-tazobactam, 4.5 g, Intravenous, Q8H  sodium chloride, 10 mL, Intravenous, Q12H  vancomycin, 7.6 mg/kg, Intravenous, Q12H      Continuous Infusions:Pharmacy to dose vancomycin,       PRN Meds:.  Calcium Replacement - Follow Nurse / BPA Driven Protocol    Magnesium Standard Dose Replacement - Follow Nurse / BPA Driven Protocol    Morphine **AND** naloxone    nitroglycerin    ondansetron    Pharmacy to dose vancomycin    Phosphorus Replacement - Follow Nurse / BPA Driven Protocol    Potassium Replacement - Follow Nurse / BPA Driven Protocol    [COMPLETED] Insert Peripheral IV **AND** sodium chloride    sodium  chloride    sodium chloride    traMADol    Assessment & Plan   Assessment & Plan     Active Hospital Problems    Diagnosis  POA    **Acute respiratory failure with hypoxia [J96.01]  Unknown    Ascites [R18.8]  Yes    Liver disease [K76.9]  Yes    Cellulitis [L03.90]  Yes    Hypertensive urgency [I16.0]  Yes    Fluid overload, unspecified [E87.70]  Yes      Resolved Hospital Problems   No resolved problems to display.        Brief Hospital Course to date:  Camron James is a 68 y.o. male who presented with acute respiratory failure with hypoxia secondary to volume overload.  Imaging reveals pulmonary edema on chest x-ray.  CT abdomen pelvis revealed chronic liver disease with ascites and anasarca.  There is also concern for bilateral lower extremity edema and cellulitis.    Acute respiratory failure with hypoxia  Volume overload  Anasarca  Pulmonary edema  -Received IV Lasix 80 mg in the ED  -Continue Lasix 20 mg IV twice daily  -Echo pending  -Continue O2 to maintain sats greater than 90, currently on 3 L nasal cannula (no home oxygen requirement)    Bilateral lower extremity edema/cellulitis  -Continue vancomycin and Zosyn  -Blood cultures pending  -Wound care consult requested  -Continue Lasix    Hypertensive urgency  Hypertensive encephalopathy, improved  -Blood pressure improved after Lasix  -Continue labetalol as needed    Chronic liver disease with ascites and splenomegaly  -GI consult requested    Hypokalemia  -Replace per protocol      Expected Discharge Location and Transportation: Home versus rehab  Expected Discharge   Expected Discharge Date: 1/1/2024; Expected Discharge Time:      DVT prophylaxis:  Medical DVT prophylaxis orders are present.     AM-PAC 6 Clicks Score (PT): 13 (12/30/23 0042)    CODE STATUS:   Code Status and Medical Interventions:   Ordered at: 12/29/23 3350     Level Of Support Discussed With:    Patient     Code Status (Patient has no pulse and is not breathing):    CPR  (Attempt to Resuscitate)     Medical Interventions (Patient has pulse or is breathing):    Full Support       Kathi Vaughn MD  12/30/23

## 2023-12-30 NOTE — ED NOTES
Camron James    Nursing Report ED to Floor:  Mental status: A/O X4  Ambulatory status: CANE AT BASELINE  Oxygen Therapy:  2L  Cardiac Rhythm: NSR  Admitted from: ER/HOME  Safety Concerns:  Fall risk  Social Issues: N/A  ED Room #:  10    ED Nurse Phone Extension - #[phone] or may call [phone].      HPI:   Chief Complaint   Patient presents with    Edema       Past Medical History:  No past medical history on file.     Past Surgical History:  No past surgical history on file.     Admitting Doctor:   Jay Villarreal III, DO    Consulting Provider(s):  Consults       No orders found from 11/30/2023 to 12/30/2023.             Admitting Diagnosis:   The primary encounter diagnosis was Acute on chronic congestive heart failure, unspecified heart failure type. Diagnoses of Exertional dyspnea, Hypoxia, Pedal edema, Scrotal edema, and Elevated blood pressure reading with diagnosis of hypertension were also pertinent to this visit.    Most Recent Vitals:   Vitals:    12/29/23 2102 12/29/23 2132 12/29/23 2202 12/29/23 2232   BP: (!) 183/67 (!) 195/94 (!) 189/100 174/73   BP Location:       Patient Position:       Pulse: 82 90 89 90   Resp:       Temp:       TempSrc:       SpO2: (!) 89% 98% 98% 96%   Weight:       Height:           Active LDAs/IV Access:   Lines, Drains & Airways       Active LDAs       Name Placement date Placement time Site Days    Peripheral IV 12/29/23 2016 Right Antecubital 12/29/23 2016  Antecubital  less than 1                    Labs (abnormal labs have a star):   Labs Reviewed   COMPREHENSIVE METABOLIC PANEL - Abnormal; Notable for the following components:       Result Value    Glucose 121 (*)     Sodium 135 (*)     Potassium 2.9 (*)     Chloride 94 (*)     CO2 33.0 (*)     Albumin 3.0 (*)     All other components within normal limits    Narrative:     GFR Normal >60  Chronic Kidney Disease <60  Kidney Failure <15     SINGLE HSTROPONIN T - Abnormal; Notable for the following components:     HS Troponin T 31 (*)     All other components within normal limits    Narrative:     High Sensitive Troponin T Reference Range:  <14.0 ng/L- Negative Female for AMI  <22.0 ng/L- Negative Male for AMI  >=14 - Abnormal Female indicating possible myocardial injury.  >=22 - Abnormal Male indicating possible myocardial injury.   Clinicians would have to utilize clinical acumen, EKG, Troponin, and serial changes to determine if it is an Acute Myocardial Infarction or myocardial injury due to an underlying chronic condition.        BNP (IN-HOUSE) - Abnormal; Notable for the following components:    proBNP 2,572.0 (*)     All other components within normal limits    Narrative:     This assay is used as an aid in the diagnosis of individuals suspected of having heart failure. It can be used as an aid in the diagnosis of acute decompensated heart failure (ADHF) in patients presenting with signs and symptoms of ADHF to the emergency department (ED). In addition, NT-proBNP of <300 pg/mL indicates ADHF is not likely.    Age Range Result Interpretation  NT-proBNP Concentration (pg/mL:      <50             Positive            >450                   Gray                 300-450                    Negative             <300    50-75           Positive            >900                  Gray                300-900                  Negative            <300      >75             Positive            >1800                  Gray                300-1800                  Negative            <300   MAGNESIUM - Abnormal; Notable for the following components:    Magnesium 1.4 (*)     All other components within normal limits   CBC WITH AUTO DIFFERENTIAL - Abnormal; Notable for the following components:    WBC 13.32 (*)     Neutrophil % 81.7 (*)     Lymphocyte % 8.6 (*)     Neutrophils, Absolute 10.89 (*)     Immature Grans, Absolute 0.07 (*)     All other components within normal limits   BLOOD GAS, ARTERIAL W/CO-OXIMETRY - Abnormal; Notable for  "the following components:    pH, Arterial 7.459 (*)     pCO2, Arterial 51.0 (*)     HCO3, Arterial 36.2 (*)     Base Excess, Arterial 10.5 (*)     Oxyhemoglobin 93.1 (*)     Carboxyhemoglobin 4.3 (*)     CO2 Content 37.7 (*)     pCO2, Temperature Corrected 51.0 (*)     All other components within normal limits   COVID-19 AND FLU A/B, NP SWAB IN TRANSPORT MEDIA 1 HR TAT - Normal    Narrative:     Fact sheet for providers: https://www.fda.gov/media/311170/download    Fact sheet for patients: https://www.fda.gov/media/745394/download    Test performed by PCR.   PROCALCITONIN - Normal    Narrative:     As a Marker for Sepsis (Non-Neonates):    1. <0.5 ng/mL represents a low risk of severe sepsis and/or septic shock.  2. >2 ng/mL represents a high risk of severe sepsis and/or septic shock.    As a Marker for Lower Respiratory Tract Infections that require antibiotic therapy:    PCT on Admission    Antibiotic Therapy       6-12 Hrs later    >0.5                Strongly Recommended  >0.25 - <0.5        Recommended   0.1 - 0.25          Discouraged              Remeasure/reassess PCT  <0.1                Strongly Discouraged     Remeasure/reassess PCT    As 28 day mortality risk marker: \"Change in Procalcitonin Result\" (>80% or <=80%) if Day 0 (or Day 1) and Day 4 values are available. Refer to http://www.[email or web address]-pct-calculator.com    Change in PCT <=80%  A decrease of PCT levels below or equal to 80% defines a positive change in PCT test result representing a higher risk for 28-day all-cause mortality of patients diagnosed with severe sepsis for septic shock.    Change in PCT >80%  A decrease of PCT levels of more than 80% defines a negative change in PCT result representing a lower risk for 28-day all-cause mortality of patients diagnosed with severe sepsis or septic shock.      LACTIC ACID, PLASMA - Normal   AMMONIA - Normal   COVID PRE-OP / PRE-PROCEDURE SCREENING ORDER (NO ISOLATION)    Narrative:     The following " orders were created for panel order COVID PRE-OP / PRE-PROCEDURE SCREENING ORDER (NO ISOLATION) - Swab, Nasopharynx.  Procedure                               Abnormality         Status                     ---------                               -----------         ------                     COVID-19 and FLU A/B PCR...[[id number]]  Normal              Final result                 Please view results for these tests on the individual orders.   BLOOD GAS, ARTERIAL   URINALYSIS W/ MICROSCOPIC IF INDICATED (NO CULTURE)   URINE DRUG SCREEN   MAGNESIUM   FENTANYL, URINE   CBC AND DIFFERENTIAL    Narrative:     The following orders were created for panel order CBC & Differential.  Procedure                               Abnormality         Status                     ---------                               -----------         ------                     CBC Auto Differential[[id number]]        Abnormal            Final result                 Please view results for these tests on the individual orders.       Meds Given in ED:   Medications   sodium chloride 0.9 % flush 10 mL (has no administration in time range)   Potassium Replacement - Follow Nurse / BPA Driven Protocol (has no administration in time range)   Magnesium Standard Dose Replacement - Follow Nurse / BPA Driven Protocol (has no administration in time range)   Phosphorus Replacement - Follow Nurse / BPA Driven Protocol (has no administration in time range)   Calcium Replacement - Follow Nurse / BPA Driven Protocol (has no administration in time range)   potassium chloride (K-DUR,KLOR-CON) CR tablet 40 mEq (has no administration in time range)   magnesium sulfate 2g/50 mL (PREMIX) infusion (2 g Intravenous New Bag 12/29/23 2159)   nitroglycerin (NITROSTAT) ointment 1 inch (has no administration in time range)   furosemide (LASIX) injection 80 mg (80 mg Intravenous Given 12/29/23 2154)

## 2023-12-30 NOTE — ED PROVIDER NOTES
Subjective   History of Present Illness  This is a 68-year-old male that presents the ER with acute on chronic pedal edema that has worsened over the last week.  Patient has longstanding pedal edema with thickening of the skin to both lower extremities.  He normally takes Lasix for fluid retention, but he has been out of his medications for greater than 1 month.  He is in the process of transitioning to a new PCP and his first appointment is not until January, 2024.  Over the last week, swelling has worsened to the legs and is extending to the both thighs.  He also reports significant scrotal swelling and abdominal distention.  He reports shortness of breath with exertion.  He denies any chest pain.  He has history of morbid obesity with BMI of 40.  He also has history of hypertension and gout.  He denies any other significant past medical history.  He denies any urinary or bowel changes.  He says that yesterday he also was having some hallucinations.  He was having some visual and auditory hallucinations and actually smelling things that were not there either.  He denies any dysuria, urgency, or frequency.  He denies any abdominal, flank, or CVA pain.  He only takes lisinopril and allopurinol.  No other new medication changes.    History provided by:  Patient and spouse  Leg Swelling  Location:  Worsening pedal edema with chronic pedal edema, unexpected weight gain, abdominal bloating, SOA with exertion  Duration:  1 week  Chronicity:  Chronic  Context:  History of pedal edema. Pt previously took Lasix but he is changing doctors and ran out of medicine over a month ago. Legs are weeping and scrotum are swollen and abdomen is distended.  Associated symptoms: shortness of breath (with exertion.)    Associated symptoms: no abdominal pain, no chest pain, no congestion, no cough, no diarrhea, no fatigue, no fever, no myalgias, no nausea, no sore throat and no vomiting    Associated symptoms comment:  Scrotal edema. Pt  says he was having some visual and auditory hallucinations. Seeing people and having smells of things that weren't there.      Review of Systems   Constitutional:  Positive for activity change and unexpected weight change. Negative for fatigue and fever.   HENT: Negative.  Negative for congestion, sinus pressure, sneezing and sore throat.    Respiratory:  Positive for chest tightness and shortness of breath (with exertion.). Negative for cough.    Cardiovascular:  Positive for leg swelling (pedal edema to BLE; worse x 1 week.). Negative for chest pain and palpitations.   Gastrointestinal:  Positive for abdominal distention. Negative for abdominal pain, constipation, diarrhea, nausea and vomiting.   Genitourinary:  Positive for scrotal swelling (Scrotal edema bilaterally with bilateral lower extremity edema and abdominal distention.). Negative for flank pain and frequency.   Musculoskeletal:  Positive for gait problem. Negative for myalgias.   Skin:  Positive for color change (Skin thickening with faint erythema to bilateral lower extremities.  Patient does have some weeping of serous fluid from the legs.  No evidence of acute cellulitis.).   Neurological:  Positive for weakness (generally weak).   Psychiatric/Behavioral:  Positive for hallucinations (visual and auditory).    All other systems reviewed and are negative.      No past medical history on file.    No Known Allergies    No past surgical history on file.    No family history on file.    Social History     Socioeconomic History    Marital status:            Objective   Physical Exam  Vitals and nursing note reviewed.   Constitutional:       General: He is not in acute distress.     Appearance: Normal appearance. He is obese. He is ill-appearing. He is not toxic-appearing or diaphoretic.      Comments: Patient appears mildly ill.  Obesity with BMI of 40.  No acute distress.  Nontoxic.   HENT:      Head: Normocephalic and atraumatic.      Nose: Nose  normal.      Mouth/Throat:      Mouth: Mucous membranes are moist.      Pharynx: Oropharynx is clear.   Eyes:      Extraocular Movements: Extraocular movements intact.      Conjunctiva/sclera: Conjunctivae normal.      Pupils: Pupils are equal, round, and reactive to light.   Neck:      Vascular: No JVD.   Cardiovascular:      Rate and Rhythm: Normal rate and regular rhythm. No extrasystoles are present.     Pulses: Normal pulses.           Dorsalis pedis pulses are 2+ on the right side and 2+ on the left side.        Posterior tibial pulses are 2+ on the right side and 2+ on the left side.      Heart sounds: Normal heart sounds.      Comments: Regular rate and rhythm.  3+ pitting edema to bilateral lower extremities with some faint confluent erythema and weeping of serous fluid.  Skin thickening noted to both lower extremities.  Edema extends to both thighs as well as scrotum.  Pulmonary:      Effort: Pulmonary effort is normal. Tachypnea present. No accessory muscle usage or retractions.      Breath sounds: Examination of the right-lower field reveals decreased breath sounds. Examination of the left-lower field reveals decreased breath sounds. Decreased breath sounds present. No wheezing, rhonchi or rales.      Comments: Tachypnea with conversation.  No wheezes or rhonchi.  Decreased breath sounds to the bilateral bases.  No rales noted.  No respiratory distress.  Abdominal:      General: Bowel sounds are normal. There is distension.      Palpations: Abdomen is soft.      Tenderness: There is no abdominal tenderness. There is no right CVA tenderness, left CVA tenderness, guarding or rebound.      Comments: Central obesity with distention.  Nontender to palpation.  No flank or CVA tenderness.   Genitourinary:     Comments: Bilateral scrotal edema due to extensive edema to bilateral lower extremities and CHF exacerbation  Musculoskeletal:         General: Normal range of motion.      Cervical back: Normal range of  motion and neck supple.      Right lower leg: 3+ Edema present.      Left lower leg: 3+ Edema present.   Skin:     General: Skin is warm and dry.   Neurological:      General: No focal deficit present.      Mental Status: He is alert and oriented to person, place, and time.      Cranial Nerves: Cranial nerves 2-12 are intact.      Sensory: Sensation is intact.      Motor: Motor function is intact.      Coordination: Coordination is intact.      Gait: Gait is intact.      Comments: Neuro intact and nonfocal.  Alert and oriented x 3.  Generally weak with overall functional decline.  No focal deficits.  Slowed gait.   Psychiatric:         Attention and Perception: He perceives auditory and visual hallucinations.         Mood and Affect: Mood and affect normal.         Speech: Speech normal.         Behavior: Behavior normal. Behavior is cooperative.         Thought Content: Thought content normal.         Cognition and Memory: Cognition normal.         Judgment: Judgment normal.      Comments: Patient answers all questions appropriately.  He reported some visual and auditory hallucinations yesterday but he denies any hallucinations at present.  Normal cognition and memory and normal judgment.  Patient is calm and cooperative.         Procedures           ED Course  ED Course as of 12/29/23 2246   Fri Dec 29, 2023   2241 I personally interpreted EKG which showed sinus rhythm.  No acute ST-T wave changes consistent with ischemia.  No evidence of arrhythmia.  I also personally interpreted chest x-ray which showed cardiomegaly with moderate pulmonary vascular congestion.  CT of the brain without contrast revealed no acute intracranial abnormality.  CBC showed white blood cell count of 13,000.  Chemistries revealed sodium 135, potassium 2.9, bicarb 33.  LFTs were normal.  Patient tested negative for COVID-19 and influenza.  Magnesium was 1.4.  High-sensitivity troponin was 31.  Lactic acid was 1.6.  BNP was 2572.  ABG  revealed pH 7.45, pCO2 51, pO2 85 and bicarb is 36.  CT of the abdomen/pelvis without contrast revealed morphologic changes suggestive of chronic liver disease with sequela of portal hypertension including upper abdominal collaterals and splenomegaly.  Findings of anasarca with small volume ascites and diffuse subcutaneous edema and significant scrotal edema.  Some of abdominal pelvic fluid may be loculated for example in the left pelvis and along the left paracolic gutter.  Lymphadenopathy throughout the abdomen and pelvis as well as lower chest.  Findings are nonspecific.  Recommend follow-up scan in 3 months for recheck.  Patient clinically appears to be volume overloaded.  He has been out of his diuretic greater than 1 month.  No previous echocardiogram in epic.  I ordered Lasix 80 mg IV and we initiated electrolyte replacement protocol for potassium and magnesium.  Discussed admission with hospitalist, Dr. Villarreal for acute CHF exacerbation.  He is agreeable to admission on telemetry.  I also ordered 1 inch of topical nitroglycerin paste.  O2 sat was 90 to 93% on room air and we applied 2 L per nasal cannula.  Recommend echocardiogram in the morning.  Patient ready for admission. [FC]      ED Course User Index  [FC] Roxie Rosenthal, NIKOLAY                                        Recent Results (from the past 24 hour(s))   ECG 12 Lead Other; pedal edema    Collection Time: 12/29/23  8:01 PM   Result Value Ref Range    QT Interval 414 ms    QTC Interval 509 ms   Comprehensive Metabolic Panel    Collection Time: 12/29/23  8:15 PM    Specimen: Blood   Result Value Ref Range    Glucose 121 (H) 65 - 99 mg/dL    BUN 11 8 - 23 mg/dL    Creatinine 0.90 0.76 - 1.27 mg/dL    Sodium 135 (L) 136 - 145 mmol/L    Potassium 2.9 (L) 3.5 - 5.2 mmol/L    Chloride 94 (L) 98 - 107 mmol/L    CO2 33.0 (H) 22.0 - 29.0 mmol/L    Calcium 8.7 8.6 - 10.5 mg/dL    Total Protein 6.6 6.0 - 8.5 g/dL    Albumin 3.0 (L) 3.5 - 5.2 g/dL    ALT (SGPT)  10 1 - 41 U/L    AST (SGOT) 27 1 - 40 U/L    Alkaline Phosphatase 103 39 - 117 U/L    Total Bilirubin 1.1 0.0 - 1.2 mg/dL    Globulin 3.6 gm/dL    A/G Ratio 0.8 g/dL    BUN/Creatinine Ratio 12.2 7.0 - 25.0    Anion Gap 8.0 5.0 - 15.0 mmol/L    eGFR 93.0 >60.0 mL/min/1.73   Single High Sensitivity Troponin T    Collection Time: 12/29/23  8:15 PM    Specimen: Blood   Result Value Ref Range    HS Troponin T 31 (H) <22 ng/L   BNP    Collection Time: 12/29/23  8:15 PM    Specimen: Blood   Result Value Ref Range    proBNP 2,572.0 (H) 0.0 - 900.0 pg/mL   Procalcitonin    Collection Time: 12/29/23  8:15 PM    Specimen: Blood   Result Value Ref Range    Procalcitonin 0.07 0.00 - 0.25 ng/mL   Lactic Acid, Plasma    Collection Time: 12/29/23  8:15 PM    Specimen: Blood   Result Value Ref Range    Lactate 1.6 0.5 - 2.0 mmol/L   Magnesium    Collection Time: 12/29/23  8:15 PM    Specimen: Blood   Result Value Ref Range    Magnesium 1.4 (L) 1.6 - 2.4 mg/dL   Ammonia    Collection Time: 12/29/23  8:15 PM    Specimen: Blood   Result Value Ref Range    Ammonia 30 16 - 60 umol/L   COVID-19 and FLU A/B PCR, 1 HR TAT - Swab, Nasopharynx    Collection Time: 12/29/23  8:15 PM    Specimen: Nasopharynx; Swab   Result Value Ref Range    COVID19 Not Detected Not Detected - Ref. Range    Influenza A PCR Not Detected Not Detected    Influenza B PCR Not Detected Not Detected   CBC Auto Differential    Collection Time: 12/29/23  8:15 PM    Specimen: Blood   Result Value Ref Range    WBC 13.32 (H) 3.40 - 10.80 10*3/mm3    RBC 4.96 4.14 - 5.80 10*6/mm3    Hemoglobin 14.3 13.0 - 17.7 g/dL    Hematocrit 43.4 37.5 - 51.0 %    MCV 87.5 79.0 - 97.0 fL    MCH 28.8 26.6 - 33.0 pg    MCHC 32.9 31.5 - 35.7 g/dL    RDW 13.7 12.3 - 15.4 %    RDW-SD 44.0 37.0 - 54.0 fl    MPV 9.1 6.0 - 12.0 fL    Platelets 326 140 - 450 10*3/mm3    Neutrophil % 81.7 (H) 42.7 - 76.0 %    Lymphocyte % 8.6 (L) 19.6 - 45.3 %    Monocyte % 6.4 5.0 - 12.0 %    Eosinophil % 2.4 0.3  - 6.2 %    Basophil % 0.4 0.0 - 1.5 %    Immature Grans % 0.5 0.0 - 0.5 %    Neutrophils, Absolute 10.89 (H) 1.70 - 7.00 10*3/mm3    Lymphocytes, Absolute 1.14 0.70 - 3.10 10*3/mm3    Monocytes, Absolute 0.85 0.10 - 0.90 10*3/mm3    Eosinophils, Absolute 0.32 0.00 - 0.40 10*3/mm3    Basophils, Absolute 0.05 0.00 - 0.20 10*3/mm3    Immature Grans, Absolute 0.07 (H) 0.00 - 0.05 10*3/mm3    nRBC 0.0 0.0 - 0.2 /100 WBC   Blood Gas, Arterial With Co-Ox    Collection Time: 12/29/23  9:46 PM    Specimen: Arterial Blood   Result Value Ref Range    Site Left Radial     Ulysses's Test N/A     pH, Arterial 7.459 (H) 7.350 - 7.450 pH units    pCO2, Arterial 51.0 (H) 35.0 - 45.0 mm Hg    pO2, Arterial 85.8 83.0 - 108.0 mm Hg    HCO3, Arterial 36.2 (H) 20.0 - 26.0 mmol/L    Base Excess, Arterial 10.5 (H) 0.0 - 2.0 mmol/L    Hemoglobin, Blood Gas 13.5 13.5 - 17.5 g/dL    Hematocrit, Blood Gas 41.3 38.0 - 51.0 %    Oxyhemoglobin 93.1 (L) 94 - 99 %    Methemoglobin 0.10 0.00 - 1.50 %    Carboxyhemoglobin 4.3 (H) 0 - 2 %    CO2 Content 37.7 (H) 22 - 33 mmol/L    Temperature 37.0     Barometric Pressure for Blood Gas      Modality Nasal Cannula     FIO2 28 %    Rate 0 Breaths/minute    PIP 0 cmH2O    IPAP 0     EPAP 0     pH, Temp Corrected 7.459 pH Units    pCO2, Temperature Corrected 51.0 (H) 35 - 48 mm Hg    pO2, Temperature Corrected 85.8 83 - 108 mm Hg     Note: In addition to lab results from this visit, the labs listed above may include labs taken at another facility or during a different encounter within the last 24 hours. Please correlate lab times with ED admission and discharge times for further clarification of the services performed during this visit.    XR Chest 1 View   Final Result   Impression:   Congestive heart failure with moderate pulmonary interstitial edema.         Electronically Signed: Ronn Liz MD     12/29/2023 8:29 PM EST     Workstation ID: [id number]      CT Abdomen Pelvis Without Contrast   Final Result    Impression:      Morphologic changes suggestive of chronic liver disease, with sequela of portal hypertension including upper abdominal collaterals and splenomegaly.       Findings of anasarca with small volume ascites, diffuse subcutaneous edema, and significant scrotal edema. Some of the abdominopelvic fluid may be loculated, for example in the left pelvis and along the left paracolic gutter.      Lymphadenopathy throughout the abdomen and pelvis, as well as the lower chest. Findings are nonspecific and can be seen with infectious, inflammatory, and neoplastic etiologies. A follow-up exam is recommended to evaluate for interval change, perhaps in    3 months, with further recommendations at that time, which may include PET/CT or tissue sampling, if these are not known findings.      Above findings as imaged without contrast.         Electronically Signed: Benjamin Addison MD     12/29/2023 8:17 PM EST     Workstation ID: [id number]      CT Head Without Contrast   Final Result   Impression:      No evidence of acute intracranial abnormality.         Electronically Signed: Benjamin Addison MD     12/29/2023 7:57 PM EST     Workstation ID: [id number]        Vitals:    12/29/23 2102 12/29/23 2132 12/29/23 2202 12/29/23 2232   BP: (!) 183/67 (!) 195/94 (!) 189/100 174/73   BP Location:       Patient Position:       Pulse: 82 90 89 90   Resp:       Temp:       TempSrc:       SpO2: (!) 89% 98% 98% 96%   Weight:       Height:         Medications   sodium chloride 0.9 % flush 10 mL (has no administration in time range)   Potassium Replacement - Follow Nurse / BPA Driven Protocol (has no administration in time range)   Magnesium Standard Dose Replacement - Follow Nurse / BPA Driven Protocol (has no administration in time range)   Phosphorus Replacement - Follow Nurse / BPA Driven Protocol (has no administration in time range)   Calcium Replacement - Follow Nurse / BPA Driven Protocol (has no administration in time range)    potassium chloride (K-DUR,KLOR-CON) CR tablet 40 mEq (has no administration in time range)   magnesium sulfate 2g/50 mL (PREMIX) infusion (2 g Intravenous New Bag 12/29/23 2159)   nitroglycerin (NITROSTAT) ointment 1 inch (has no administration in time range)   furosemide (LASIX) injection 80 mg (80 mg Intravenous Given 12/29/23 2154)     ECG/EMG Results (last 24 hours)       ** No results found for the last 24 hours. **          ECG 12 Lead Other; pedal edema   Preliminary Result   Test Reason : Other~   Blood Pressure :   */*   mmHG   Vent. Rate :  91 BPM     Atrial Rate :  91 BPM      P-R Int : 168 ms          QRS Dur :  92 ms       QT Int : 414 ms       P-R-T Axes :  74  27  35 degrees      QTc Int : 509 ms      Normal sinus rhythm   Nonspecific ST and T wave abnormality   Abnormal ECG   No previous ECGs available      Referred By: EDMD           Confirmed By:                  Medical Decision Making  Amount and/or Complexity of Data Reviewed  Labs: ordered.  Radiology: ordered.  ECG/medicine tests: ordered.    Risk  OTC drugs.  Prescription drug management.        Final diagnoses:   Acute on chronic congestive heart failure, unspecified heart failure type   Exertional dyspnea   Hypoxia   Pedal edema   Scrotal edema   Elevated blood pressure reading with diagnosis of hypertension   Hypomagnesemia   Hypokalemia   History of obesity   H/O noncompliance with medical treatment, presenting hazards to health       ED Disposition  ED Disposition       ED Disposition   Decision to Admit    Condition   --    Comment   Level of Care: Telemetry [5]   Diagnosis: Fluid overload, unspecified [[id number]]   Admitting Physician: KEMI DILLARD III [[id number]   Attending Physician: KEMI DILLARD III [[id number]]   Bed Request Comments: tele obs (not CDU)                 No follow-up provider specified.       Medication List      No changes were made to your prescriptions during this visit.            Roxie Rosenthal,  NIKOLAY  12/29/23 7575

## 2023-12-30 NOTE — PROGRESS NOTES
"Pharmacy Consult-Vancomycin Dosing  Camron James is a  68 y.o. male receiving vancomycin therapy.     Indication: SSTI  Consulting Provider: hospitalist  ID Consult: No    Goal AUC: 400 - 600 mg/L*hr    Current Antimicrobial Therapy  Anti-Infectives (From admission, onward)      Ordered     Dose/Rate Route Frequency Start Stop    12/29/23 2353  vancomycin (VANCOCIN) 1000 mg/200 mL dextrose 5% IVPB        Ordering Provider: Jayjay Schaefer, PharmD   See [facility] for full Linked Orders Report.    7.6 mg/kg × 132 kg  over 60 Minutes Intravenous Every 12 Hours 12/30/23 1200 01/05/24 2359    12/29/23 2333  piperacillin-tazobactam (ZOSYN) 3.375 g in iso-osmotic dextrose 50 ml (premix)        Ordering Provider: Jay Villarreal III, DO    3.375 g  over 4 Hours Intravenous Every 8 Hours 12/30/23 0617 01/06/24 0616    12/29/23 2346  vancomycin 2750 mg/500 mL 0.9% NS IVPB ([facility])        Ordering Provider: Jayjay Schaefer PharmD    20 mg/kg × 132 kg  over 165 Minutes Intravenous Once 12/30/23 0002      12/29/23 2333  piperacillin-tazobactam (ZOSYN) 3.375 g in iso-osmotic dextrose 50 ml (premix)        Ordering Provider: Jay Villarreal III, DO    3.375 g  over 30 Minutes Intravenous Once 12/29/23 2349      12/29/23 2333  Pharmacy to dose vancomycin        Ordering Provider: Jay Villarreal III, DO     Does not apply Continuous PRN 12/29/23 2331 01/05/24 2330            Allergies  Allergies as of 12/29/2023    (No Known Allergies)       Labs  Results from last 7 days   Lab Units 12/29/23 2015   BUN mg/dL 11   CREATININE mg/dL 0.90     Results from last 7 days   Lab Units 12/29/23 2015   WBC 10*3/mm3 13.32*       Evaluation of Dosing  Last Dose Received in the ED/Outside Facility:               N/A  Is Patient on Dialysis or Renal Replacement:               No    Ht - 180.3 cm (71\")  Wt - 132 kg (290 lb)    Estimated Creatinine Clearance: 108.9 mL/min (by C-G formula based on SCr of 0.9 " mg/dL).  Intake & Output (last 3 days)       None            Microbiology and Radiology  Microbiology Results (last 10 days)       Procedure Component Value - Date/Time    COVID PRE-OP / PRE-PROCEDURE SCREENING ORDER (NO ISOLATION) - Swab, Nasopharynx [[id number]]  (Normal) Collected: 12/29/23 2015    Lab Status: Final result Specimen: Swab from Nasopharynx Updated: 12/29/23 2058    Narrative:      The following orders were created for panel order COVID PRE-OP / PRE-PROCEDURE SCREENING ORDER (NO ISOLATION) - Swab, Nasopharynx.  Procedure                               Abnormality         Status                     ---------                               -----------         ------                     COVID-19 and FLU A/B PCR...[[id number]]  Normal              Final result                 Please view results for these tests on the individual orders.    COVID-19 and FLU A/B PCR, 1 HR TAT - Swab, Nasopharynx [[id number]]  (Normal) Collected: 12/29/23 2015    Lab Status: Final result Specimen: Swab from Nasopharynx Updated: 12/29/23 2058     COVID19 Not Detected     Influenza A PCR Not Detected     Influenza B PCR Not Detected    Narrative:      Fact sheet for providers: https://www.fda.gov/media/373992/download    Fact sheet for patients: https://www.fda.gov/media/738306/download    Test performed by PCR.            Reported Vancomycin Levels                   [facility] AUC Calculation:  Current AUC:   -- mg/L*hr    Predicted Steady State AUC on Current Dose: -- mg/L*hr  _________________________________  Predicted Steady State AUC on New Dose:   491 mg/L*hr    Assessment/Plan:  1. Will give vancomycin 2750 mg IV once (given in ED 12/31 at 10:00)                               followed by      Vancomycin 1000 mg IV q 12 hours    2. Vancomycin trough is scheduled 12/31 at 10:00 prior to the 4th dose. Predicted Steady State AUC at current dose: 491 mg/L*hr.  I have also ordered a vancomycin 'peak' level 12/31 at 02:30 in  order to better obtain patient specific kinetics due to weight > 100 kg and actual body weight > 140% of IBW.    Jayjay Schaefer PharmD, BCPS  12/29/2023  23:56 EST

## 2023-12-30 NOTE — H&P
"    [facility] Medicine Services  HISTORY AND PHYSICAL    Patient Name: Camron James  : 1955  MRN: [id number]  Primary Care Physician: Senthil Lazar MD  Date of admission: 2023      Subjective   Subjective     Chief Complaint:  Lower extremity and scrotal swelling    HPI:  Camron James is a 68 y.o. male who states that he has not had his Lasix for approximately 1 month due to a change in physician recently.  He states that over that time, he has noticed gradual swelling of his bilateral lower extremities and swelling of the scrotum.  He also feels like he is carrying some extra fluid in the abdomen.  He states the scrotal swelling has worsened over the last 3 days.  He denies any scrotal or penile pain, noting that there \"is sometimes pain when I sit on it too quickly ever since it became swollen,\" but he has no pain otherwise.  No difficulty with urination, no discharge, no fever/chills.  He states he was told recently that he has worsening erythema over the bilateral distal lower extremities, and he states he has previously seen \"someone to help with my legs,\" but does not recall any specifics about this.  He does confirm some shortness of breath but this only started today (), and he denies that it is worse with exertion, other than when he got in his car to come to the ED today.  He denies chest pain, abdominal pain, bowel habit change, slurred speech/facial droop, visual changes, headache, dizziness/lightheadedness, or syncope.  His medical history, per his report, is significant only for hypertension and depression.  He denies any prior diagnosis of CHF, but states he \"is concerned about it.\"      Personal History     Medical history is as noted in the HPI above          No past surgical history on file.    Family History: Mother had CHF; father had type 2 diabetes.    Social History:  He currently smokes cigarettes and requests nicotine patch. "  He states he drinks alcohol a few times a week.  He states he began smoking and drinking during the COVID pandemic.  He states that prepandemic he frequently smokes marijuana but has not done so recently; he states back in the 80s he used some psychedelic drugs but nothing recently.  Social History     Social History Narrative    Not on file       Medications:  Available home medication information reviewed.  (Not in a hospital admission)      No Known Allergies    Objective   Objective     Vital Signs:   Temp:  [98.2 °F (36.8 °C)] 98.2 °F (36.8 °C)  Heart Rate:  [82-92] 90  Resp:  [20] 20  BP: (174-209)/() 174/73  Flow (L/min):  [2] 2       Physical Exam   Constitutional: Awake, alert, NAD, pleasant.  Eyes: PERRLA, sclerae anicteric, no conjunctival injection  HENT: NCAT, mucous membranes moist  Neck: Supple, no thyromegaly, no lymphadenopathy, trachea midline  Respiratory: Basal crackles to auscultation bilaterally, nonlabored respirations   Cardiovascular: RRR, no murmurs, rubs, or gallops, palpable pedal pulses bilaterally  Gastrointestinal: Positive bowel sounds, slightly firm secondary to fluid retention, nontender, nondistended, no peritoneal signs.  : Markedly swollen/edematous scrotum, mild erythema circumferentially, no obvious lesion, no pain or warmth on palpation.  Musculoskeletal: +3 bilateral distal lower extremity edema, no clubbing or cyanosis to extremities  Psychiatric: Appropriate affect, cooperative  Neurologic: Oriented x 3, strength symmetric in all extremities, Cranial Nerves grossly intact to confrontation, speech clear.  Easily distracted, sometimes forgets original question asked by interviewer and has to be reoriented.  Skin: There is bilateral distal lower extremity venous stasis change with nodular appearance especially on the anterior aspect, though this is circumferential.  Increased erythema/cellulitic appearance of the bilateral distal LEs from just distal to the knee down  to the ankle, increased warmth on palpation, increased pain on palpation as well.    Result Review:  I have personally reviewed the results from the time of this admission to 12/29/2023 23:37 EST and agree with these findings:  [x]  Laboratory list / accordion  []  Microbiology  [x]  Radiology  []  EKG/Telemetry   []  Cardiology/Vascular   []  Pathology  []  Old records  []  Other:  Most notable findings include: I reviewed chest x-ray which is a single AP view and by my read shows increased vascular markings indicating CHF, borderline cardiomegaly.  I reviewed EKG which by my read shows normal sinus rhythm, ventricular rate approximately 90 bpm, normal axis, nonspecific ST/T wave changes.        LAB RESULTS:      Lab 12/29/23 2015   WBC 13.32*   HEMOGLOBIN 14.3   HEMATOCRIT 43.4   PLATELETS 326   NEUTROS ABS 10.89*   IMMATURE GRANS (ABS) 0.07*   LYMPHS ABS 1.14   MONOS ABS 0.85   EOS ABS 0.32   MCV 87.5   PROCALCITONIN 0.07   LACTATE 1.6         Lab 12/29/23 2015   SODIUM 135*   POTASSIUM 2.9*   CHLORIDE 94*   CO2 33.0*   ANION GAP 8.0   BUN 11   CREATININE 0.90   EGFR 93.0   GLUCOSE 121*   CALCIUM 8.7   MAGNESIUM 1.4*         Lab 12/29/23 2015   TOTAL PROTEIN 6.6   ALBUMIN 3.0*   GLOBULIN 3.6   ALT (SGPT) 10   AST (SGOT) 27   BILIRUBIN 1.1   ALK PHOS 103         Lab 12/29/23 2015   PROBNP 2,572.0*   HSTROP T 31*                 Lab 12/29/23  2146   PH, ARTERIAL 7.459*   PCO2, ARTERIAL 51.0*   PO2 ART 85.8   FIO2 28   HCO3 ART 36.2*   BASE EXCESS ART 10.5*   CARBOXYHEMOGLOBIN 4.3*     UA          12/29/2023    22:31   Urinalysis   Specific [place], UA 1.006    Ketones, UA Negative    Blood, UA Trace    Leukocytes, UA Negative    Nitrite, UA Negative        Microbiology Results (last 10 days)       Procedure Component Value - Date/Time    COVID PRE-OP / PRE-PROCEDURE SCREENING ORDER (NO ISOLATION) - Swab, Nasopharynx [[id number]]  (Normal) Collected: 12/29/23 2015    Lab Status: Final result Specimen: Swab from  Nasopharynx Updated: 12/29/23 2058    Narrative:      The following orders were created for panel order COVID PRE-OP / PRE-PROCEDURE SCREENING ORDER (NO ISOLATION) - Swab, Nasopharynx.  Procedure                               Abnormality         Status                     ---------                               -----------         ------                     COVID-19 and FLU A/B PCR...[[id number]]  Normal              Final result                 Please view results for these tests on the individual orders.    COVID-19 and FLU A/B PCR, 1 HR TAT - Swab, Nasopharynx [[id number]]  (Normal) Collected: 12/29/23 2015    Lab Status: Final result Specimen: Swab from Nasopharynx Updated: 12/29/23 2058     COVID19 Not Detected     Influenza A PCR Not Detected     Influenza B PCR Not Detected    Narrative:      Fact sheet for providers: https://www.fda.gov/media/595700/download    Fact sheet for patients: https://www.fda.gov/media/553741/download    Test performed by PCR.            XR Chest 1 View    Result Date: 12/29/2023  XR CHEST 1 VW Date of Exam: 12/29/2023 8:12 PM EST Indication: pedal edema, SOA Comparison: None available. Findings: Heart shadow is moderately enlarged. The vasculature is cephalized and there is a diffuse pulmonary edema pattern present. There may be minimal effusions. No pneumothorax or lung consolidation is seen. Advanced right shoulder joint DJD is seen with high riding humerus, suggesting chronic rotator cuff tear.     Impression: Impression: Congestive heart failure with moderate pulmonary interstitial edema. Electronically Signed: Ronn Liz MD  12/29/2023 8:29 PM EST  Workstation ID: [id number]    CT Abdomen Pelvis Without Contrast    Result Date: 12/29/2023  CT ABDOMEN PELVIS WO CONTRAST Date of Exam: 12/29/2023 7:44 PM EST Indication: Abdominal pain, acute, nonlocalized. Comparison: None available. Technique: Axial CT images were obtained of the abdomen and pelvis without the administration of  contrast. Reconstructed coronal and sagittal images were also obtained. Automated exposure control and iterative construction methods were used. Findings: Lower Thorax: Heart appears enlarged. No focal consolidation. Partially-visualized left axillary and lower paraesophageal lymphadenopathy. Liver: Widening of the fissures with caudate lobe hypertrophy, which are morphologic changes seen with chronic liver disease. No evidence of focal liver lesion on this noncontrast exam Gallbladder and bile ducts: Gallbladder is unremarkable. No biliary ductal dilatation. Pancreas: No pancreatic duct dilation. No surrounding inflammation. Spleen: Enlarged measuring 15 cm in greatest dimension. Adrenal glands: No discrete adrenal nodule. Kidneys: No hydronephrosis. No nephroureterolithiasis. Small bilateral likely cysts, as imaged without contrast. Urinary bladder: Unremarkable. Reproductive Organs: Extensive scrotal edema. Stomach and Bowel: No evidence of bowel obstruction. Lymph nodes: Multiple enlarged lymph nodes throughout the abdomen and pelvis. For example an aortocaval lymph node measures 2.8 x 2.3 cm (series 2 image 85) and a right inguinal lymph node measures 3.8 x 2.3 cm (series 2 image 174). Vessels: No abdominal aortic aneurysm. Suspected upper abdominal portosystemic collaterals. Peritoneum and retroperitoneum: Small volume free fluid, some which may be loculated, for example along the left paracolic gutter and in the left pelvis. Soft tissues: Diffuse subcutaneous edema. Osseous structures: No acute or suspicious osseous lesions. Multilevel degenerative changes of the spine. Advanced arthritis of the right hip.     Impression: Impression: Morphologic changes suggestive of chronic liver disease, with sequela of portal hypertension including upper abdominal collaterals and splenomegaly. Findings of anasarca with small volume ascites, diffuse subcutaneous edema, and significant scrotal edema. Some of the  abdominopelvic fluid may be loculated, for example in the left pelvis and along the left paracolic gutter. Lymphadenopathy throughout the abdomen and pelvis, as well as the lower chest. Findings are nonspecific and can be seen with infectious, inflammatory, and neoplastic etiologies. A follow-up exam is recommended to evaluate for interval change, perhaps in 3 months, with further recommendations at that time, which may include PET/CT or tissue sampling, if these are not known findings. Above findings as imaged without contrast. Electronically Signed: Benjamin Addison MD  12/29/2023 8:17 PM EST  Workstation ID: [id number]    CT Head Without Contrast    Result Date: 12/29/2023  CT HEAD WO CONTRAST Date of Exam: 12/29/2023 7:37 PM EST Indication: hallucinations, confusion. Comparison: None available. Technique: Axial CT images were obtained of the head without contrast administration.  Automated exposure control and iterative construction methods were used. Findings: No evidence of acute intracranial hemorrhage or mass effect. No extra-axial collection. The gray white matter differentiation is preserved. Ventricles and sulci are symmetric. The mastoid air cells and paranasal sinuses are well aerated. Globes and extraocular muscles are unremarkable. No acute or suspicious osseous abnormality. Soft tissues within normal limits.     Impression: Impression: No evidence of acute intracranial abnormality. Electronically Signed: Benjamin Addison MD  12/29/2023 7:57 PM EST  Workstation ID: [id number]         Assessment & Plan   Assessment & Plan     Active Hospital Problems    Diagnosis  POA    **Fluid overload, unspecified [E87.70]  Yes     68 M with fluid overload, pulmonary edema on chest x-ray and bilateral lower extremity edema; also lower extremity venous stasis change with some cellulitic change currently.  Also has CT abdomen/pelvis which reveals chronic liver disease and ascites which is apparently new to the patient.    Lower  extremity edema  Pulmonary edema on chest x-ray  High suspicion for CHF  - Received Lasix 80 mg IV x 1 in the ED.  - Will continue Lasix 20 mg IV every 12 hours for now.  - Check 2D echo.  - Fluid and sodium restriction.  - Daily weights, I's and O's.  - Continue telemetry.  - Will defer adding beta-blocker or ACE/ARB at this time pending echo result.  - Will also defer cardiology consult for now, pending echo result.  - Check a.m. troponin.    Hypertension  - He states he is not sure what he takes for this, and no home medications have been entered at this time.  - Will receive IV Lasix as above.  - Can add as needed IV labetalol if needed during the overnight.    Chronic liver disease with ascites and splenomegaly, per CT abdomen/pelvis  - This would evidently be a new diagnosis for the patient.  - CT read also mentions anasarca and small volume ascites, diffuse subcutaneous edema as well as scrotal edema.  - Will receive IV Lasix as above; he states he has not had his oral Lasix for approximately 1 month, as mentioned in HPI; may need addition of spironolactone.  - GI consult requested, input appreciated, will follow up their recommendations.    Bilateral lower extremity cellulitis  - Chronic venous stasis change likely contributing to erythematous and nodular changes of his distal lower extremities, but he does have increased warmth and pain on palpation; will start IV antibiotic coverage with vancomycin and Zosyn, though procalcitonin is normal.  - Await blood culture results.  - Wound care consult.    Depression  - He is not sure what he takes for this and no home medications have been entered at this time.  - We will continue appropriate home agents when home meds are verified and entered.    Hypokalemia  - Addressed in the ED.  - Continue telemetry.  - Electrolyte replacement protocol.        Total time spent: 86 minutes  Time spent includes time reviewing chart, face-to-face time, counseling  patient/family/caregiver, ordering medications/tests/procedures, communicating with other health care professionals, documenting clinical information in the electronic health record, and coordination of care.     DVT prophylaxis:  heparin SC q8h      CODE STATUS:  full  Code Status and Medical Interventions:   Ordered at: 12/29/23 9099     Level Of Support Discussed With:    Patient     Code Status (Patient has no pulse and is not breathing):    CPR (Attempt to Resuscitate)     Medical Interventions (Patient has pulse or is breathing):    Full Support       Expected Discharge tbd    Jay Villarreal,III, DO  12/29/23

## 2023-12-31 LAB
25(OH)D3 SERPL-MCNC: <6 NG/ML (ref 30–100)
ALPHA-FETOPROTEIN: <2 NG/ML (ref 0–8.3)
POTASSIUM SERPL-SCNC: 3.7 MMOL/L (ref 3.5–5.2)
TESTOST SERPL-MCNC: 63.2 NG/DL (ref 193–740)

## 2023-12-31 PROCEDURE — 25010000002 HEPARIN (PORCINE) PER 1000 UNITS: Performed by: INTERNAL MEDICINE

## 2023-12-31 PROCEDURE — 84132 ASSAY OF SERUM POTASSIUM: CPT | Performed by: FAMILY MEDICINE

## 2023-12-31 PROCEDURE — 86708 HEPATITIS A ANTIBODY: CPT | Performed by: NURSE PRACTITIONER

## 2023-12-31 PROCEDURE — 25010000002 FUROSEMIDE PER 20 MG: Performed by: INTERNAL MEDICINE

## 2023-12-31 PROCEDURE — 82306 VITAMIN D 25 HYDROXY: CPT | Performed by: NURSE PRACTITIONER

## 2023-12-31 PROCEDURE — 25010000002 CEFAZOLIN PER 500 MG: Performed by: FAMILY MEDICINE

## 2023-12-31 PROCEDURE — 25010000002 THIAMINE PER 100 MG: Performed by: NURSE PRACTITIONER

## 2023-12-31 PROCEDURE — 86317 IMMUNOASSAY INFECTIOUS AGENT: CPT | Performed by: NURSE PRACTITIONER

## 2023-12-31 PROCEDURE — 82105 ALPHA-FETOPROTEIN SERUM: CPT | Performed by: NURSE PRACTITIONER

## 2023-12-31 PROCEDURE — 99232 SBSQ HOSP IP/OBS MODERATE 35: CPT | Performed by: NURSE PRACTITIONER

## 2023-12-31 RX ORDER — MELATONIN
1000 DAILY
Status: DISCONTINUED | OUTPATIENT
Start: 2023-12-31 | End: 2024-01-03 | Stop reason: SDUPTHER

## 2023-12-31 RX ADMIN — HEPARIN SODIUM 5000 UNITS: 5000 INJECTION INTRAVENOUS; SUBCUTANEOUS at 05:53

## 2023-12-31 RX ADMIN — SODIUM CHLORIDE 2000 MG: 900 INJECTION INTRAVENOUS at 05:53

## 2023-12-31 RX ADMIN — Medication 1000 UNITS: at 21:08

## 2023-12-31 RX ADMIN — FUROSEMIDE 20 MG: 10 INJECTION, SOLUTION INTRAMUSCULAR; INTRAVENOUS at 17:48

## 2023-12-31 RX ADMIN — FUROSEMIDE 20 MG: 10 INJECTION, SOLUTION INTRAMUSCULAR; INTRAVENOUS at 05:53

## 2023-12-31 RX ADMIN — Medication 10 ML: at 09:56

## 2023-12-31 RX ADMIN — SODIUM CHLORIDE 2000 MG: 900 INJECTION INTRAVENOUS at 15:28

## 2023-12-31 RX ADMIN — Medication 10 ML: at 21:08

## 2023-12-31 RX ADMIN — SODIUM CHLORIDE 2000 MG: 900 INJECTION INTRAVENOUS at 21:07

## 2023-12-31 RX ADMIN — HEPARIN SODIUM 5000 UNITS: 5000 INJECTION INTRAVENOUS; SUBCUTANEOUS at 15:28

## 2023-12-31 RX ADMIN — THIAMINE HYDROCHLORIDE 100 MG: 100 INJECTION, SOLUTION INTRAMUSCULAR; INTRAVENOUS at 09:56

## 2023-12-31 RX ADMIN — HEPARIN SODIUM 5000 UNITS: 5000 INJECTION INTRAVENOUS; SUBCUTANEOUS at 21:07

## 2023-12-31 RX ADMIN — Medication 1 TABLET: at 09:56

## 2023-12-31 RX ADMIN — NICOTINE 1 PATCH: 14 PATCH, EXTENDED RELEASE TRANSDERMAL at 05:54

## 2023-12-31 RX ADMIN — FOLIC ACID 1 MG: 1 TABLET ORAL at 09:56

## 2023-12-31 NOTE — PROGRESS NOTES
"GI Daily Progress Note  Subjective:    Chief Complaint: Follow-up liver disease    Patient resting in bed no acute distress.  Notes he is feeling well today and that his edematous state is improving.  No further hallucinations per patient report.  Tolerating diet.  No new or worsening GI complaints.  Denies pain.    Objective:    /66 (BP Location: Left arm, Patient Position: Lying)   Pulse 71   Temp 97.6 °F (36.4 °C) (Oral)   Resp 18   Ht 180.3 cm (71\")   Wt (!) 143 kg (315 lb 14.4 oz)   SpO2 97%   BMI 44.06 kg/m²     Physical Exam  Vitals and nursing note reviewed.   Constitutional:       General: He is not in acute distress.     Appearance: Normal appearance. He is obese. He is ill-appearing. He is not toxic-appearing.   Cardiovascular:      Rate and Rhythm: Normal rate and regular rhythm.      Pulses: Normal pulses.      Heart sounds: Normal heart sounds.   Pulmonary:      Effort: Pulmonary effort is normal. No respiratory distress.      Breath sounds: Normal breath sounds.   Abdominal:      General: Abdomen is flat. Bowel sounds are normal. There is no distension.      Palpations: Abdomen is soft. There is no mass.      Tenderness: There is no abdominal tenderness. There is no guarding or rebound.      Hernia: No hernia is present.   Musculoskeletal:      Right lower leg: Edema present.      Left lower leg: Edema present.      Comments: Cellulitis of lower extremity noted.   Skin:     General: Skin is warm and dry.      Capillary Refill: Capillary refill takes less than 2 seconds.      Coloration: Skin is not jaundiced or pale.   Neurological:      General: No focal deficit present.      Mental Status: He is alert and oriented to person, place, and time.      Comments: No asterixis   Psychiatric:         Mood and Affect: Mood normal.         Behavior: Behavior normal.         Thought Content: Thought content normal.         Judgment: Judgment normal.         Lab  I have personally reviewed most " recent cardiac tracings, lab results, and radiology images and interpretations and agree with findings.    Lab Results   Component Value Date    WBC 10.05 12/30/2023    HGB 12.2 (L) 12/30/2023    HGB 14.3 12/29/2023    MCV 86.0 12/30/2023     12/30/2023    INR 1.10 12/30/2023       Lab Results   Component Value Date    GLUCOSE 106 (H) 12/30/2023    BUN 10 12/30/2023    CREATININE 0.85 12/30/2023    BCR 11.8 12/30/2023     12/30/2023    K 3.7 12/31/2023    CO2 33.0 (H) 12/30/2023    CALCIUM 8.0 (L) 12/30/2023    ALBUMIN 2.5 (L) 12/30/2023    ALKPHOS 81 12/30/2023    BILITOT 0.9 12/30/2023    ALT 9 12/30/2023    AST 27 12/30/2023      Latest Reference Range & Units 12/30/23 16:40   Testosterone, Total 193.00 - 740.00 ng/dL 63.20 (L)   (L): Data is abnormally low     Latest Reference Range & Units 12/31/23 04:51   25 Hydroxy, Vitamin D 30.0 - 100.0 ng/ml <6.0 (L)   (L): Data is abnormally low     Latest Reference Range & Units 12/31/23 04:51   ALPHA-FETOPROTEIN 0 - 8.3 ng/mL <2     Results for orders placed during the hospital encounter of 12/29/23    Adult Transthoracic Echo Complete w/ Color, Spectral and Contrast if necessary per protocol    Interpretation Summary    Left ventricular systolic function is normal. Left ventricular ejection fraction appears to be 56 - 60%.    Left ventricular wall thickness is consistent with mild concentric hypertrophy.    Left ventricular diastolic function was normal.    The right ventricular cavity is borderline dilated.    The left atrial cavity is mildly dilated.    The right atrial cavity is borderline dilated.    Moderate tricuspid valve regurgitation is present.    Estimated right ventricular systolic pressure from tricuspid regurgitation is markedly elevated (>55 mmHg).    Assessment:      Acute respiratory failure with hypoxia    Fluid overload, unspecified    Ascites    Liver disease    Cellulitis    Hypertensive urgency    1.  Liver cirrhosis, suspect  "multifactorial secondary to elevated right heart pressures, alcohol, and fatty liver  2.  Acute respiratory failure with hypoxia, pulmonary edema  3.  Bilateral lower extremity edema with cellulitis  4.  Alcohol abuse and dependence  5.  Hypertension.    Plan:  Patient doing well today.  No further hallucinations.    I discussed the results of patient's echocardiogram and laboratory findings with patient and spouse.  Discussed diagnosis of liver cirrhosis and that is likely secondary to elevated right heart pressures and patient's longstanding history of alcohol abuse.  Also discussed his normal AFP, low vitamin D, and low testosterone level.  Immunity status to hep A and hep B are pending.    >>> Handout entitled \"Cirrhosis: What you need to know\" provided.  Includes:   -Dietary modifications    -Avoid raw shellfish    -Adequate protein intake: 170 g/day    -High-protein bedtime snack    -Frequent protein snacks while awake    -Sodium restricted diet; less than 2 g or 2000 mg/day.    -3 to 5 cups of black coffee daily   -Lifestyle modifications    -No alcohol or tobacco    -No NSAIDs; use Tylenol for mild to moderate pain    -Continue statin    -Follow-up with gastroenterology team every 6 months     -AFP and liver imaging every 6 months.   -Recommended vaccinations    -Hepatitis A: PENDING    -Hepatitis B: PENDING    -Influenza    -Pneumococcal pneumonia    -COVID-19   -Osteoporosis    -Discussed that 55% of cirrhotic patients will have osteoporosis.    -Vitamin D level is: Low, recommend replacement     -Okay for standard supplementation    -Include daily multivitamin    >>> added daily MVI and Vitamin D supplement  >>> f/u immunity studies to hep a and hep b; will vaccinate if indicated.   >>> await Liver U/S for HCC surveillance    Anticipate d/c in next 24-48 hrs pending clinical course. Will need outpatient GI f/u at d/c in the next 4-6 weeks. Will need to f/u with PCP at d/c.     Will sign off with plans for " outpatient follow up. Please call with questions.     Shubham Bird, APRN  12/31/23  17:48 EST

## 2023-12-31 NOTE — PROGRESS NOTES
[facility]  PROGRESS NOTE    Patient Name: Camron James  : 1955  MRN: [id number]    Date of Admission: 2023  Primary Care Physician: Senthil Lazar MD    Subjective   Subjective     CC:  Hypoxia    HPI:   - Patient 68-year-old who presented to the emergency department with weakness shortness of breath and swelling.  He was found to have pulmonary edema, respiratory failure with hypoxia requiring now 3 L nasal cannula.  Also concerns for liver disease and ascites noted.  Started on antibiotics for cellulitis. He reports he had hallucinations yesterday  but that has resolved now.      - Pt says swelling is a little better. He has 700ml UOP in the cannister. He denies hallucinations      Objective   Objective     Vital Signs:   Temp:  [97.8 °F (36.6 °C)-98.8 °F (37.1 °C)] 97.8 °F (36.6 °C)  Heart Rate:  [60-73] 71  Resp:  [18] 18  BP: (119-155)/(66-81) 155/66  Flow (L/min):  [3-3.5] 3.5     Physical Exam:  Constitutional: No acute distress, awake, alert  HENT: NCAT, mucous membranes moist  Respiratory: Decreased breath sounds with crackles bilaterally, respiratory effort normal   Cardiovascular: RRR  Gastrointestinal: Positive bowel sounds, soft, moderately distended  Musculoskeletal: Bilateral lower extremity edema with erythema  Psychiatric: Appropriate affect, cooperative  Neurologic: Oriented x 3, generally weak, Cranial Nerves grossly intact to confrontation, speech clear  Skin: Edema bilateral lower extremities      Results Reviewed:  LAB RESULTS:      Lab 23  0916 23  0414 23   WBC  --  10.05 13.32*   HEMOGLOBIN  --  12.2* 14.3   HEMATOCRIT  --  36.8* 43.4   PLATELETS  --  301 326   NEUTROS ABS  --  7.46* 10.89*   IMMATURE GRANS (ABS)  --  0.05 0.07*   LYMPHS ABS  --  1.39 1.14   MONOS ABS  --  0.74 0.85   EOS ABS  --  0.35 0.32   MCV  --  86.0 87.5   PROCALCITONIN  --   --  0.07   LACTATE  --   --  1.6    PROTIME 14.3  --   --          Lab 12/31/23  0451 12/30/23  1640 12/30/23  0414 12/29/23 2015   SODIUM  --   --  139 135*   POTASSIUM 3.7 3.4* 3.1*  3.1* 2.9*   CHLORIDE  --   --  97* 94*   CO2  --   --  33.0* 33.0*   ANION GAP  --   --  9.0 8.0   BUN  --   --  10 11   CREATININE  --   --  0.85 0.90   EGFR  --   --  94.6 93.0   GLUCOSE  --   --  106* 121*   CALCIUM  --   --  8.0* 8.7   MAGNESIUM  --   --  2.2 1.4*   HEMOGLOBIN A1C  --   --  5.10  --    TSH  --   --  2.360  --          Lab 12/30/23  0414 12/29/23 2015   TOTAL PROTEIN 5.4* 6.6   ALBUMIN 2.5* 3.0*   GLOBULIN 2.9 3.6   ALT (SGPT) 9 10   AST (SGOT) 27 27   BILIRUBIN 0.9 1.1   ALK PHOS 81 103         Lab 12/30/23  0916 12/30/23  0630 12/30/23  0414 12/29/23 2015   PROBNP  --   --   --  2,572.0*   HSTROP T  --  40* 35* 31*   PROTIME 14.3  --   --   --    INR 1.10  --   --   --          Lab 12/30/23 0414   CHOLESTEROL 107   LDL CHOL 47   HDL CHOL 47   TRIGLYCERIDES 59             Lab 12/29/23  2146   PH, ARTERIAL 7.459*   PCO2, ARTERIAL 51.0*   PO2 ART 85.8   FIO2 28   HCO3 ART 36.2*   BASE EXCESS ART 10.5*   CARBOXYHEMOGLOBIN 4.3*     Brief Urine Lab Results  (Last result in the past 365 days)        Color   Clarity   Blood   Leuk Est   Nitrite   Protein   CREAT   Urine HCG        12/29/23 2231 Yellow   Clear   Trace   Negative   Negative   30 mg/dL (1+)                   Microbiology Results Abnormal       Procedure Component Value - Date/Time    Blood Culture - Blood, Arm, Left [[id number]]  (Normal) Collected: 12/30/23 0047    Lab Status: Preliminary result Specimen: Blood from Arm, Left Updated: 12/31/23 0715     Blood Culture No growth at 24 hours    Blood Culture - Blood, Arm, Right [[id number]]  (Normal) Collected: 12/30/23 0047    Lab Status: Preliminary result Specimen: Blood from Arm, Right Updated: 12/31/23 0715     Blood Culture No growth at 24 hours    MRSA Screen, PCR (Inpatient) - Swab, Nares [[id number]]  (Normal) Collected: 12/30/23  0136    Lab Status: Final result Specimen: Swab from Nares Updated: 12/30/23 0811     MRSA PCR Negative    Narrative:      The negative predictive value of this diagnostic test is high and should only be used to consider de-escalating anti-MRSA therapy. A positive result may indicate colonization with MRSA and must be correlated clinically.  MRSA Negative    COVID PRE-OP / PRE-PROCEDURE SCREENING ORDER (NO ISOLATION) - Swab, Nasopharynx [[id number]]  (Normal) Collected: 12/29/23 2015    Lab Status: Final result Specimen: Swab from Nasopharynx Updated: 12/29/23 2058    Narrative:      The following orders were created for panel order COVID PRE-OP / PRE-PROCEDURE SCREENING ORDER (NO ISOLATION) - Swab, Nasopharynx.  Procedure                               Abnormality         Status                     ---------                               -----------         ------                     COVID-19 and FLU A/B PCR...[[id number]]  Normal              Final result                 Please view results for these tests on the individual orders.    COVID-19 and FLU A/B PCR, 1 HR TAT - Swab, Nasopharynx [[id number]]  (Normal) Collected: 12/29/23 2015    Lab Status: Final result Specimen: Swab from Nasopharynx Updated: 12/29/23 2058     COVID19 Not Detected     Influenza A PCR Not Detected     Influenza B PCR Not Detected    Narrative:      Fact sheet for providers: https://www.fda.gov/media/643663/download    Fact sheet for patients: https://www.fda.gov/media/348520/download    Test performed by PCR.            Adult Transthoracic Echo Complete w/ Color, Spectral and Contrast if necessary per protocol    Result Date: 12/30/2023    Left ventricular systolic function is normal. Left ventricular ejection fraction appears to be 56 - 60%.   Left ventricular wall thickness is consistent with mild concentric hypertrophy.   Left ventricular diastolic function was normal.   The right ventricular cavity is borderline dilated.   The left  atrial cavity is mildly dilated.   The right atrial cavity is borderline dilated.   Moderate tricuspid valve regurgitation is present.   Estimated right ventricular systolic pressure from tricuspid regurgitation is markedly elevated (>55 mmHg).     XR Chest 1 View    Result Date: 12/29/2023  XR CHEST 1 VW Date of Exam: 12/29/2023 8:12 PM EST Indication: pedal edema, SOA Comparison: None available. Findings: Heart shadow is moderately enlarged. The vasculature is cephalized and there is a diffuse pulmonary edema pattern present. There may be minimal effusions. No pneumothorax or lung consolidation is seen. Advanced right shoulder joint DJD is seen with high riding humerus, suggesting chronic rotator cuff tear.     Impression: Impression: Congestive heart failure with moderate pulmonary interstitial edema. Electronically Signed: Ronn Liz MD  12/29/2023 8:29 PM EST  Workstation ID: [id number]    CT Abdomen Pelvis Without Contrast    Result Date: 12/29/2023  CT ABDOMEN PELVIS WO CONTRAST Date of Exam: 12/29/2023 7:44 PM EST Indication: Abdominal pain, acute, nonlocalized. Comparison: None available. Technique: Axial CT images were obtained of the abdomen and pelvis without the administration of contrast. Reconstructed coronal and sagittal images were also obtained. Automated exposure control and iterative construction methods were used. Findings: Lower Thorax: Heart appears enlarged. No focal consolidation. Partially-visualized left axillary and lower paraesophageal lymphadenopathy. Liver: Widening of the fissures with caudate lobe hypertrophy, which are morphologic changes seen with chronic liver disease. No evidence of focal liver lesion on this noncontrast exam Gallbladder and bile ducts: Gallbladder is unremarkable. No biliary ductal dilatation. Pancreas: No pancreatic duct dilation. No surrounding inflammation. Spleen: Enlarged measuring 15 cm in greatest dimension. Adrenal glands: No discrete adrenal nodule.  Kidneys: No hydronephrosis. No nephroureterolithiasis. Small bilateral likely cysts, as imaged without contrast. Urinary bladder: Unremarkable. Reproductive Organs: Extensive scrotal edema. Stomach and Bowel: No evidence of bowel obstruction. Lymph nodes: Multiple enlarged lymph nodes throughout the abdomen and pelvis. For example an aortocaval lymph node measures 2.8 x 2.3 cm (series 2 image 85) and a right inguinal lymph node measures 3.8 x 2.3 cm (series 2 image 174). Vessels: No abdominal aortic aneurysm. Suspected upper abdominal portosystemic collaterals. Peritoneum and retroperitoneum: Small volume free fluid, some which may be loculated, for example along the left paracolic gutter and in the left pelvis. Soft tissues: Diffuse subcutaneous edema. Osseous structures: No acute or suspicious osseous lesions. Multilevel degenerative changes of the spine. Advanced arthritis of the right hip.     Impression: Impression: Morphologic changes suggestive of chronic liver disease, with sequela of portal hypertension including upper abdominal collaterals and splenomegaly. Findings of anasarca with small volume ascites, diffuse subcutaneous edema, and significant scrotal edema. Some of the abdominopelvic fluid may be loculated, for example in the left pelvis and along the left paracolic gutter. Lymphadenopathy throughout the abdomen and pelvis, as well as the lower chest. Findings are nonspecific and can be seen with infectious, inflammatory, and neoplastic etiologies. A follow-up exam is recommended to evaluate for interval change, perhaps in 3 months, with further recommendations at that time, which may include PET/CT or tissue sampling, if these are not known findings. Above findings as imaged without contrast. Electronically Signed: Benjamin Addison MD  12/29/2023 8:17 PM EST  Workstation ID: [id number]    CT Head Without Contrast    Result Date: 12/29/2023  CT HEAD WO CONTRAST Date of Exam: 12/29/2023 7:37 PM EST  Indication: hallucinations, confusion. Comparison: None available. Technique: Axial CT images were obtained of the head without contrast administration.  Automated exposure control and iterative construction methods were used. Findings: No evidence of acute intracranial hemorrhage or mass effect. No extra-axial collection. The gray white matter differentiation is preserved. Ventricles and sulci are symmetric. The mastoid air cells and paranasal sinuses are well aerated. Globes and extraocular muscles are unremarkable. No acute or suspicious osseous abnormality. Soft tissues within normal limits.     Impression: Impression: No evidence of acute intracranial abnormality. Electronically Signed: Benjamin Addison MD  12/29/2023 7:57 PM EST  Workstation ID: [id number]     Results for orders placed during the hospital encounter of 12/29/23    Adult Transthoracic Echo Complete w/ Color, Spectral and Contrast if necessary per protocol    Interpretation Summary    Left ventricular systolic function is normal. Left ventricular ejection fraction appears to be 56 - 60%.    Left ventricular wall thickness is consistent with mild concentric hypertrophy.    Left ventricular diastolic function was normal.    The right ventricular cavity is borderline dilated.    The left atrial cavity is mildly dilated.    The right atrial cavity is borderline dilated.    Moderate tricuspid valve regurgitation is present.    Estimated right ventricular systolic pressure from tricuspid regurgitation is markedly elevated (>55 mmHg).      Current medications:  Scheduled Meds:ceFAZolin, 2,000 mg, Intravenous, Q8H  folic acid, 1 mg, Oral, Daily  furosemide, 20 mg, Intravenous, Q12H  heparin (porcine), 5,000 Units, Subcutaneous, Q8H  multivitamin with minerals, 1 tablet, Oral, Daily  nicotine, 1 patch, Transdermal, Q24H  sodium chloride, 10 mL, Intravenous, Q12H  thiamine (B-1) IV, 100 mg, Intravenous, Daily      Continuous Infusions:     PRN Meds:.  Calcium  Replacement - Follow Nurse / BPA Driven Protocol    Magnesium Standard Dose Replacement - Follow Nurse / BPA Driven Protocol    nitroglycerin    ondansetron    Phosphorus Replacement - Follow Nurse / BPA Driven Protocol    Potassium Replacement - Follow Nurse / BPA Driven Protocol    [COMPLETED] Insert Peripheral IV **AND** sodium chloride    sodium chloride    sodium chloride    traMADol    Assessment & Plan   Assessment & Plan     Active Hospital Problems    Diagnosis  POA    **Acute respiratory failure with hypoxia [J96.01]  Unknown    Ascites [R18.8]  Yes    Liver disease [K76.9]  Yes    Cellulitis [L03.90]  Yes    Hypertensive urgency [I16.0]  Yes    Fluid overload, unspecified [E87.70]  Yes      Resolved Hospital Problems   No resolved problems to display.        Brief Hospital Course to date:  Camron James is a 68 y.o. male who presented with acute respiratory failure with hypoxia secondary to volume overload.  Imaging reveals pulmonary edema on chest x-ray.  CT abdomen pelvis revealed chronic liver disease with ascites and anasarca.  There is also concern for bilateral lower extremity edema and cellulitis.    Acute respiratory failure with hypoxia  Volume overload  Anasarca  Pulmonary edema  -Received IV Lasix 80 mg in the ED  -Continue Lasix 20 mg IV twice daily  -Echo with EF 56-60  -Continue O2 to maintain sats greater than 90, currently on 3.5 L nasal cannula (no home oxygen requirement)    Bilateral lower extremity edema/cellulitis  -Continue cefazolin  -Blood cultures pending  -Wound care consult requested  -Continue Lasix    Hypertensive urgency  Hypertensive encephalopathy, improved  -Blood pressure improved after Lasix  -Continue labetalol as needed    Chronic liver disease with ascites and splenomegaly  -GI consulted    Hypokalemia  -Replace per protocol      Expected Discharge Location and Transportation: Home versus rehab  Expected Discharge   Expected Discharge Date: 1/1/2024; Expected  Discharge Time:      DVT prophylaxis:  Medical DVT prophylaxis orders are present.     AM-PAC 6 Clicks Score (PT): 13 (12/30/23 2010)    CODE STATUS:   Code Status and Medical Interventions:   Ordered at: 12/29/23 7072     Level Of Support Discussed With:    Patient     Code Status (Patient has no pulse and is not breathing):    CPR (Attempt to Resuscitate)     Medical Interventions (Patient has pulse or is breathing):    Full Support       Kathi Vaughn MD  12/31/23

## 2024-01-01 ENCOUNTER — APPOINTMENT (OUTPATIENT)
Dept: ULTRASOUND IMAGING | Facility: HOSPITAL | Age: 69
End: 2024-01-01
Payer: MEDICARE

## 2024-01-01 LAB
ANION GAP SERPL CALCULATED.3IONS-SCNC: 5 MMOL/L (ref 5–15)
BUN SERPL-MCNC: 18 MG/DL (ref 8–23)
BUN/CREAT SERPL: 17.6 (ref 7–25)
CALCIUM SPEC-SCNC: 8.2 MG/DL (ref 8.6–10.5)
CHLORIDE SERPL-SCNC: 97 MMOL/L (ref 98–107)
CO2 SERPL-SCNC: 36 MMOL/L (ref 22–29)
CREAT SERPL-MCNC: 1.02 MG/DL (ref 0.76–1.27)
EGFRCR SERPLBLD CKD-EPI 2021: 80.1 ML/MIN/1.73
GLUCOSE SERPL-MCNC: 121 MG/DL (ref 65–99)
POTASSIUM SERPL-SCNC: 4.4 MMOL/L (ref 3.5–5.2)
SODIUM SERPL-SCNC: 138 MMOL/L (ref 136–145)

## 2024-01-01 PROCEDURE — 25010000002 THIAMINE PER 100 MG: Performed by: NURSE PRACTITIONER

## 2024-01-01 PROCEDURE — 97530 THERAPEUTIC ACTIVITIES: CPT

## 2024-01-01 PROCEDURE — 97535 SELF CARE MNGMENT TRAINING: CPT

## 2024-01-01 PROCEDURE — 80048 BASIC METABOLIC PNL TOTAL CA: CPT | Performed by: FAMILY MEDICINE

## 2024-01-01 PROCEDURE — 97166 OT EVAL MOD COMPLEX 45 MIN: CPT

## 2024-01-01 PROCEDURE — 76981 USE PARENCHYMA: CPT

## 2024-01-01 PROCEDURE — 97162 PT EVAL MOD COMPLEX 30 MIN: CPT

## 2024-01-01 PROCEDURE — 76705 ECHO EXAM OF ABDOMEN: CPT

## 2024-01-01 PROCEDURE — 25010000002 HEPARIN (PORCINE) PER 1000 UNITS: Performed by: INTERNAL MEDICINE

## 2024-01-01 PROCEDURE — 25010000002 FUROSEMIDE PER 20 MG: Performed by: INTERNAL MEDICINE

## 2024-01-01 PROCEDURE — 25010000002 CEFAZOLIN PER 500 MG: Performed by: FAMILY MEDICINE

## 2024-01-01 RX ADMIN — THIAMINE HYDROCHLORIDE 100 MG: 100 INJECTION, SOLUTION INTRAMUSCULAR; INTRAVENOUS at 09:19

## 2024-01-01 RX ADMIN — HEPARIN SODIUM 5000 UNITS: 5000 INJECTION INTRAVENOUS; SUBCUTANEOUS at 21:57

## 2024-01-01 RX ADMIN — NICOTINE 1 PATCH: 14 PATCH, EXTENDED RELEASE TRANSDERMAL at 05:47

## 2024-01-01 RX ADMIN — FOLIC ACID 1 MG: 1 TABLET ORAL at 09:19

## 2024-01-01 RX ADMIN — SODIUM CHLORIDE 2000 MG: 900 INJECTION INTRAVENOUS at 14:30

## 2024-01-01 RX ADMIN — HEPARIN SODIUM 5000 UNITS: 5000 INJECTION INTRAVENOUS; SUBCUTANEOUS at 14:30

## 2024-01-01 RX ADMIN — SODIUM CHLORIDE 2000 MG: 900 INJECTION INTRAVENOUS at 21:57

## 2024-01-01 RX ADMIN — Medication 10 ML: at 09:19

## 2024-01-01 RX ADMIN — Medication 10 ML: at 21:57

## 2024-01-01 RX ADMIN — Medication 1 TABLET: at 09:19

## 2024-01-01 RX ADMIN — FUROSEMIDE 20 MG: 10 INJECTION, SOLUTION INTRAMUSCULAR; INTRAVENOUS at 18:17

## 2024-01-01 RX ADMIN — FUROSEMIDE 20 MG: 10 INJECTION, SOLUTION INTRAMUSCULAR; INTRAVENOUS at 05:46

## 2024-01-01 RX ADMIN — HEPARIN SODIUM 5000 UNITS: 5000 INJECTION INTRAVENOUS; SUBCUTANEOUS at 05:46

## 2024-01-01 RX ADMIN — Medication 1000 UNITS: at 09:19

## 2024-01-01 RX ADMIN — SODIUM CHLORIDE 2000 MG: 900 INJECTION INTRAVENOUS at 05:45

## 2024-01-01 NOTE — PLAN OF CARE
Goal Outcome Evaluation:  Plan of Care Reviewed With: patient        Progress: no change  Outcome Evaluation: PT eval completed. Pt completed a bed to chair transfer with Mod A x2 with BUE support and STS with Max A x2 with BUE support. Mobility limited d/t generalized weakness and edema. Pt demonstrated mobility below baseline function with strength deficits, decreased AROM in BLE, balance deficits, and decreased functional endurance. d/c rec IRF      Anticipated Discharge Disposition (PT): inpatient rehabilitation facility

## 2024-01-01 NOTE — THERAPY EVALUATION
Patient Name: Camron James  : 1955    MRN: [id number]                              Today's Date: 2024       Admit Date: 2023    Visit Dx:     ICD-10-CM ICD-9-CM   1. Acute on chronic congestive heart failure, unspecified heart failure type  I50.9 428.0   2. Exertional dyspnea  R06.09 786.09   3. Hypoxia  R09.02 799.02   4. Pedal edema  R60.0 782.3   5. Scrotal edema  N50.89 608.86   6. Elevated blood pressure reading with diagnosis of hypertension  I10 401.9   7. Hypomagnesemia  E83.42 275.2   8. Hypokalemia  E87.6 276.8   9. History of obesity  Z86.39 V12.29   10. H/O noncompliance with medical treatment, presenting hazards to health  Z91.199 V15.81     Patient Active Problem List   Diagnosis    Fluid overload, unspecified    Acute respiratory failure with hypoxia    Ascites    Liver disease    Cellulitis    Hypertensive urgency     Past Medical History:   Diagnosis Date    CHF (congestive heart failure)     Elevated cholesterol     Hypertension      History reviewed. No pertinent surgical history.   General Information       Row Name 24 0908          OT Time and Intention    Document Type evaluation  -SW     Mode of Treatment occupational therapy  -       Row Name 24 09          General Information    Patient Profile Reviewed yes  -SW     Prior Level of Function independent:;all household mobility;ADL's;driving;home management  -     Existing Precautions/Restrictions fall  -SW     Barriers to Rehab medically complex  -       Row Name 24          Occupational Profile    Environmental Supports and Barriers (Occupational Profile) Pt lives alone but has a spouse that lives 5 minutes away.  He has a cane, a walk in shower with a seat.  -       Row Name 2408          Living Environment    People in Home alone;spouse  spouse can stay with him while he heals.  -       Row Name 24 0908          Home Main Entrance    Number of Stairs, Main Entrance  five  -[facility] Name 01/01/24 0908          Stairs Within Home, Primary    Stairs, Within Home, Primary basement is where laundry is located.  -     Number of Stairs, Within Home, Primary other (see comments)  -[facility] Name 01/01/24 0908          Cognition    Orientation Status (Cognition) oriented x 4  -[facility] Name 01/01/24 0908          Safety Issues, Functional Mobility    Impairments Affecting Function (Mobility) balance;coordination;endurance/activity tolerance;pain;strength  -               User Key  (r) = Recorded By, (t) = Taken By, (c) = Cosigned By      Initials Name Provider Type     Brigitte Lee OT Occupational Therapist                     Mobility/ADL's       Row Name 01/01/24 0815          Bed Mobility    Bed Mobility supine-sit  -     Supine-Sit [place] (Bed Mobility) maximum assist (25% patient effort);verbal cues;2 person assist  -     Assistive Device (Bed Mobility) head of bed elevated  -[facility] Name 01/01/24 0815          Transfers    Transfers bed-chair transfer;sit-stand transfer  -[facility] Name 01/01/24 0815          Bed-Chair Transfer    Bed-Chair [place] (Transfers) moderate assist (50% patient effort);2 person assist  -     Assistive Device (Bed-Chair Transfers) other (see comments)  -     Comment, (Bed-Chair Transfer) BUE support  -[facility] Name 01/01/24 0815          Sit-Stand Transfer    Sit-Stand [place] (Transfers) maximum assist (25% patient effort);2 person assist  -     Assistive Device (Sit-Stand Transfers) other (see comments)  -     Comment, (Sit-Stand Transfer) BUE support  -[facility] Name 01/01/24 0815          Activities of Daily Living    BADL Assessment/Intervention lower body dressing;grooming;toileting  -[facility] Name 01/01/24 0815          Lower Body Dressing Assessment/Training    [place] Level (Lower Body Dressing) lower body dressing skills;socks;maximum assist (25% patient effort)  -      Position (Lower Body Dressing) edge of bed sitting  -       Row Name 01/01/24 0815          Grooming Assessment/Training    [place] Level (Grooming) grooming skills;wash face, hands;set up  -     Position (Grooming) sitting up in bed  -       Row Name 01/01/24 0815          Toileting Assessment/Training    [place] Level (Toileting) toileting skills;set up  -SW     Position (Toileting) edge of bed sitting  -               User Key  (r) = Recorded By, (t) = Taken By, (c) = Cosigned By      Initials Name Provider Type    Brigitte Chaney OT Occupational Therapist                   Obj/Interventions       Row Name 01/01/24 0815          Sensory Assessment (Somatosensory)    Sensory Assessment (Somatosensory) UE sensation intact  -[facility] Name 01/01/24 0815          Vision Assessment/Intervention    Visual Impairment/Limitations WFL  -[facility] Name 01/01/24 0815          Range of Motion Comprehensive    General Range of Motion bilateral upper extremity ROM WFL  -[facility] Name 01/01/24 0815          Strength Comprehensive (MMT)    General Manual Muscle Testing (MMT) Assessment no strength deficits identified  -     Comment, General Manual Muscle Testing (MMT) Assessment BUEs 5/5  -[facility] Name 01/01/24 0815          Balance    Balance Assessment sitting static balance;sitting dynamic balance;sit to stand dynamic balance;standing static balance;standing dynamic balance  -     Static Sitting Balance standby assist  -     Dynamic Sitting Balance standby assist  -     Position, Sitting Balance unsupported  -     Sit to Stand Dynamic Balance maximum assist;2-person assist  -SW     Static Standing Balance moderate assist;2-person assist  -SW     Dynamic Standing Balance moderate assist;2-person assist  -     Position/Device Used, Standing Balance supported  -     Balance Interventions sitting;standing;sit to stand;supported;static;dynamic;minimal challenge;occupation  based/functional task  -               User Key  (r) = Recorded By, (t) = Taken By, (c) = Cosigned By      Initials Name Provider Type    Brigitte Chaney OT Occupational Therapist                   Goals/Plan       Row Name 01/01/24 0815          Transfer Goal 1 (OT)    Activity/Assistive Device (Transfer Goal 1, OT) toilet  -SW     [place] Level/Cues Needed (Transfer Goal 1, OT) moderate assist (50-74% patient effort)  -SW     Time Frame (Transfer Goal 1, OT) long term goal (LTG);by discharge  -SW     Progress/Outcome (Transfer Goal 1, OT) goal ongoing  -       Row Name 01/01/24 0815          Dressing Goal 1 (OT)    Activity/Device (Dressing Goal 1, OT) lower body dressing  -SW     [place]/Cues Needed (Dressing Goal 1, OT) moderate assist (50-74% patient effort)  -SW     Time Frame (Dressing Goal 1, OT) long term goal (LTG);by discharge  -SW     Progress/Outcome (Dressing Goal 1, OT) goal ongoing  -[facility] Name 01/01/24 0815          Therapy Assessment/Plan (OT)    Planned Therapy Interventions (OT) activity tolerance training;adaptive equipment training;BADL retraining;functional balance retraining;patient/caregiver education/training;transfer/mobility retraining;strengthening exercise  -               User Key  (r) = Recorded By, (t) = Taken By, (c) = Cosigned By      Initials Name Provider Type    Brigitte Chaney OT Occupational Therapist                   Clinical Impression       Row Name 01/01/24 0815          Pain Assessment    Pretreatment Pain Rating 0/10 - no pain  -SW     Posttreatment Pain Rating 0/10 - no pain  -SW     Pre/Posttreatment Pain Comment Does have scrotum pain with movement  -       Row Name 01/01/24 0815          Plan of Care Review    Plan of Care Reviewed With patient  -     Outcome Evaluation OT eval complete. Pt presents with limitations in mobility d/t edema to scrotum which causes discomfort with movemet and he has had a decline in adl function with reports  of falls. Pt max assist of 2 for supine to sit and for sts, mod assist of 2 for mob to chair from bed, max assist to chen socks, setup for grooming and using urinal.  Recommend iPOT and Irf at d/c and bariatric equipment as needed..  -       Row Name 01/01/24 0815          Therapy Assessment/Plan (OT)    Rehab Potential (OT) good, to achieve stated therapy goals  -     Criteria for Skilled Therapeutic Interventions Met (OT) yes;meets criteria;skilled treatment is necessary  -     Therapy Frequency (OT) daily  -       Row Name 01/01/24 0815          Therapy Plan Review/Discharge Plan (OT)    Equipment Needs Upon Discharge (OT) bariatric equipment  -SW     Anticipated Discharge Disposition (OT) inpatient rehabilitation facility  -       Row Name 01/01/24 0815          Vital Signs    Pre Systolic BP Rehab 155  -SW     Pre Treatment Diastolic BP 72  -SW     Pretreatment Heart Rate (beats/min) 71  -SW     Pre SpO2 (%) 95  -SW     O2 Delivery Pre Treatment supplemental O2  -SW     O2 Delivery Intra Treatment supplemental O2  -SW     O2 Delivery Post Treatment supplemental O2  -SW     Pre Patient Position Supine  -SW     Intra Patient Position Standing  -SW     Post Patient Position Sitting  -SW       Row Name 01/01/24 0815          Positioning and Restraints    Pre-Treatment Position in bed  -SW     Post Treatment Position chair  -SW     In Chair notified nsg;reclined;sitting;call light within reach;encouraged to call for assist;legs elevated  -               User Key  (r) = Recorded By, (t) = Taken By, (c) = Cosigned By      Initials Name Provider Type    Brigitte Chaney, OT Occupational Therapist                   Outcome Measures       Row Name 01/01/24 0815          How much help from another is currently needed...    Putting on and taking off regular lower body clothing? 2  -SW     Bathing (including washing, rinsing, and drying) 2  -SW     Toileting (which includes using toilet bed pan or urinal) 2  -SW      Putting on and taking off regular upper body clothing 2  -SW     Taking care of personal grooming (such as brushing teeth) 4  -SW     Eating meals 4  -SW     AM-PAC 6 Clicks Score (OT) 16  -SW       Row Name 01/01/24 0955          How much help from another person do you currently need...    Turning from your back to your side while in flat bed without using bedrails? 2  -HM     Moving from lying on back to sitting on the side of a flat bed without bedrails? 2  -HM     Moving to and from a bed to a chair (including a wheelchair)? 2  -HM     Standing up from a chair using your arms (e.g., wheelchair, bedside chair)? 2  -HM     Climbing 3-5 steps with a railing? 1  -HM     To walk in hospital room? 2  -HM     AM-PAC 6 Clicks Score (PT) 11  -HM     Highest Level of Mobility Goal 4 --> Transfer to chair/commode  -       Row Name 01/01/24 0955 01/01/24 0815       Functional Assessment    Outcome Measure Options AM-PAC 6 Clicks Basic Mobility (PT)  - AM-PAC 6 Clicks Daily Activity (OT)  -              User Key  (r) = Recorded By, (t) = Taken By, (c) = Cosigned By      Initials Name Provider Type    Brigitte Chaney OT Occupational Therapist    Amparo Magana, PT Physical Therapist                    Occupational Therapy Education       Title: PT OT SLP Therapies (In Progress)       Topic: Occupational Therapy (In Progress)       Point: ADL training (Done)       Description:   Instruct learner(s) on proper safety adaptation and remediation techniques during self care or transfers.   Instruct in proper use of assistive devices.                  Learning Progress Summary             Patient Acceptance, E, VU by ANTHONY at 1/1/2024 1014                         Point: Home exercise program (Not Started)       Description:   Instruct learner(s) on appropriate technique for monitoring, assisting and/or progressing therapeutic exercises/activities.                  Learner Progress:  Not documented in this visit.               Point: Precautions (Done)       Description:   Instruct learner(s) on prescribed precautions during self-care and functional transfers.                  Learning Progress Summary             Patient Acceptance, DAVE, VU by ANTHONY at 1/1/2024 1014                         Point: Body mechanics (Not Started)       Description:   Instruct learner(s) on proper positioning and spine alignment during self-care, functional mobility activities and/or exercises.                  Learner Progress:  Not documented in this visit.                              User Key       Initials Effective Dates Name Provider Type Discipline     06/16/21 -  Brigitte Lee, OT Occupational Therapist OT                  OT Recommendation and Plan  Planned Therapy Interventions (OT): activity tolerance training, adaptive equipment training, BADL retraining, functional balance retraining, patient/caregiver education/training, transfer/mobility retraining, strengthening exercise  Therapy Frequency (OT): daily  Plan of Care Review  Plan of Care Reviewed With: patient  Outcome Evaluation: OT eval complete. Pt presents with limitations in mobility d/t edema to scrotum which causes discomfort with movemet and he has had a decline in adl function with reports of falls. Pt max assist of 2 for supine to sit and for sts, mod assist of 2 for mob to chair from bed, max assist to chen socks, setup for grooming and using urinal.  Recommend iPOT and Irf at d/c and bariatric equipment as needed..     Time Calculation:   Evaluation Complexity (OT)  Review Occupational Profile/Medical/Therapy History Complexity: brief/low complexity  Assessment, Occupational Performance/Identification of Deficit Complexity: 3-5 performance deficits  Clinical Decision Making Complexity (OT): detailed assessment/moderate complexity  Overall Complexity of Evaluation (OT): moderate complexity     Time Calculation- OT       Row Name 01/01/24 0815             Time Calculation- OT    OT  Start Time 0815  -SW      OT Received On 01/01/24  -SW      OT Goal Re-Cert Due Date 01/11/24  -         Timed Charges    06763 - OT Self Care/Mgmt Minutes 23  -SW         Untimed Charges    OT Eval/Re-eval Minutes 40  -SW         Total Minutes    Timed Charges Total Minutes 23  -SW      Untimed Charges Total Minutes 40  -SW       Total Minutes 63  -SW                User Key  (r) = Recorded By, (t) = Taken By, (c) = Cosigned By      Initials Name Provider Type     Brigitte Lee OT Occupational Therapist                  Therapy Charges for Today       Code Description Service Date Service Provider Modifiers Qty    82883831340  OT SELF CARE/MGMT/TRAIN EA 15 MIN 1/1/2024 Brigitte Lee OT GO 2    [id number]  OT EVAL MOD COMPLEXITY 3 1/1/2024 Brigitte Lee OT GO 1                 Brigitte Lee OT  1/1/2024

## 2024-01-01 NOTE — PLAN OF CARE
Goal Outcome Evaluation:  Plan of Care Reviewed With: patient           Outcome Evaluation: OT eval complete. Pt presents with limitations in mobility d/t edema to scrotum which causes discomfort with movemet and he has had a decline in adl function with reports of falls. Pt max assist of 2 for supine to sit and for sts, mod assist of 2 for mob to chair from bed, max assist to chen socks, setup for grooming and using urinal.  Recommend iPOT and Irf at d/c and bariatric equipment as needed..      Anticipated Discharge Disposition (OT): inpatient rehabilitation facility

## 2024-01-01 NOTE — PROGRESS NOTES
[facility] Medicine Services  PROGRESS NOTE    Patient Name: Camron James  : 1955  MRN: [id number]    Date of Admission: 2023  Primary Care Physician: Senthil Lazar MD    Subjective   Subjective     CC:  Hypoxia    HPI:   - Patient 68-year-old who presented to the emergency department with weakness shortness of breath and swelling.  He was found to have pulmonary edema, respiratory failure with hypoxia requiring now 3 L nasal cannula.  Also concerns for liver disease and ascites noted.  Started on antibiotics for cellulitis. He reports he had hallucinations yesterday  but that has resolved now.      - Pt says swelling is a little better. He has 700ml UOP in the cannister. He denies hallucinations     -patient is up sitting in a chair today and states he is feeling better.  Swelling has improved a little.  Legs are still red.  He is n.p.o. for ultrasound of the liver.  Physical therapy evaluated him and recommended inpatient rehab.      Objective   Objective     Vital Signs:   Temp:  [96.6 °F (35.9 °C)-98.4 °F (36.9 °C)] 96.6 °F (35.9 °C)  Heart Rate:  [61-75] 69  Resp:  [18] 18  BP: (135-163)/(66-73) 163/73  Flow (L/min):  [3.5] 3.5     Physical Exam:  Constitutional: No acute distress, awake, alert  HENT: NCAT, mucous membranes moist  Respiratory: Decreased breath sounds with crackles bilaterally, respiratory effort normal   Cardiovascular: RRR  Gastrointestinal: Positive bowel sounds, soft, moderately distended  Musculoskeletal: Bilateral lower extremity edema with erythema, up sitting in a chair  Psychiatric: Appropriate affect, cooperative  Neurologic: Oriented x 3, generally weak, Cranial Nerves grossly intact to confrontation, speech clear  Skin: Edema bilateral lower extremities with erythema      Results Reviewed:  LAB RESULTS:      Lab 23  0916 23  0414 23   WBC  --  10.05 13.32*   HEMOGLOBIN  --  12.2* 14.3   HEMATOCRIT   --  36.8* 43.4   PLATELETS  --  301 326   NEUTROS ABS  --  7.46* 10.89*   IMMATURE GRANS (ABS)  --  0.05 0.07*   LYMPHS ABS  --  1.39 1.14   MONOS ABS  --  0.74 0.85   EOS ABS  --  0.35 0.32   MCV  --  86.0 87.5   PROCALCITONIN  --   --  0.07   LACTATE  --   --  1.6   PROTIME 14.3  --   --          Lab 12/31/23  0451 12/30/23  1640 12/30/23 0414 12/29/23 2015   SODIUM  --   --  139 135*   POTASSIUM 3.7 3.4* 3.1*  3.1* 2.9*   CHLORIDE  --   --  97* 94*   CO2  --   --  33.0* 33.0*   ANION GAP  --   --  9.0 8.0   BUN  --   --  10 11   CREATININE  --   --  0.85 0.90   EGFR  --   --  94.6 93.0   GLUCOSE  --   --  106* 121*   CALCIUM  --   --  8.0* 8.7   MAGNESIUM  --   --  2.2 1.4*   HEMOGLOBIN A1C  --   --  5.10  --    TSH  --   --  2.360  --          Lab 12/30/23 0414 12/29/23 2015   TOTAL PROTEIN 5.4* 6.6   ALBUMIN 2.5* 3.0*   GLOBULIN 2.9 3.6   ALT (SGPT) 9 10   AST (SGOT) 27 27   BILIRUBIN 0.9 1.1   ALK PHOS 81 103         Lab 12/30/23  0916 12/30/23  0630 12/30/23 0414 12/29/23 2015   PROBNP  --   --   --  2,572.0*   HSTROP T  --  40* 35* 31*   PROTIME 14.3  --   --   --    INR 1.10  --   --   --          Lab 12/30/23 0414   CHOLESTEROL 107   LDL CHOL 47   HDL CHOL 47   TRIGLYCERIDES 59             Lab 12/29/23 2146   PH, ARTERIAL 7.459*   PCO2, ARTERIAL 51.0*   PO2 ART 85.8   FIO2 28   HCO3 ART 36.2*   BASE EXCESS ART 10.5*   CARBOXYHEMOGLOBIN 4.3*     Brief Urine Lab Results  (Last result in the past 365 days)        Color   Clarity   Blood   Leuk Est   Nitrite   Protein   CREAT   Urine HCG        12/29/23 2231 Yellow   Clear   Trace   Negative   Negative   30 mg/dL (1+)                   Microbiology Results Abnormal       Procedure Component Value - Date/Time    Blood Culture - Blood, Arm, Left [[id number]]  (Normal) Collected: 12/30/23 0047    Lab Status: Preliminary result Specimen: Blood from Arm, Left Updated: 01/01/24 0715     Blood Culture No growth at 2 days    Blood Culture - Blood, Arm, Right  [[id number]]  (Normal) Collected: 12/30/23 0047    Lab Status: Preliminary result Specimen: Blood from Arm, Right Updated: 01/01/24 0715     Blood Culture No growth at 2 days    MRSA Screen, PCR (Inpatient) - Swab, Nares [[id number]]  (Normal) Collected: 12/30/23 0136    Lab Status: Final result Specimen: Swab from Nares Updated: 12/30/23 0811     MRSA PCR Negative    Narrative:      The negative predictive value of this diagnostic test is high and should only be used to consider de-escalating anti-MRSA therapy. A positive result may indicate colonization with MRSA and must be correlated clinically.  MRSA Negative    COVID PRE-OP / PRE-PROCEDURE SCREENING ORDER (NO ISOLATION) - Swab, Nasopharynx [[id number]]  (Normal) Collected: 12/29/23 2015    Lab Status: Final result Specimen: Swab from Nasopharynx Updated: 12/29/23 2058    Narrative:      The following orders were created for panel order COVID PRE-OP / PRE-PROCEDURE SCREENING ORDER (NO ISOLATION) - Swab, Nasopharynx.  Procedure                               Abnormality         Status                     ---------                               -----------         ------                     COVID-19 and FLU A/B PCR...[[id number]]  Normal              Final result                 Please view results for these tests on the individual orders.    COVID-19 and FLU A/B PCR, 1 HR TAT - Swab, Nasopharynx [[id number]]  (Normal) Collected: 12/29/23 2015    Lab Status: Final result Specimen: Swab from Nasopharynx Updated: 12/29/23 2058     COVID19 Not Detected     Influenza A PCR Not Detected     Influenza B PCR Not Detected    Narrative:      Fact sheet for providers: https://www.fda.gov/media/420669/download    Fact sheet for patients: https://www.fda.gov/media/834338/download    Test performed by PCR.            No radiology results from the last 24 hrs    Results for orders placed during the hospital encounter of 12/29/23    Adult Transthoracic Echo Complete w/ Color,  Spectral and Contrast if necessary per protocol    Interpretation Summary    Left ventricular systolic function is normal. Left ventricular ejection fraction appears to be 56 - 60%.    Left ventricular wall thickness is consistent with mild concentric hypertrophy.    Left ventricular diastolic function was normal.    The right ventricular cavity is borderline dilated.    The left atrial cavity is mildly dilated.    The right atrial cavity is borderline dilated.    Moderate tricuspid valve regurgitation is present.    Estimated right ventricular systolic pressure from tricuspid regurgitation is markedly elevated (>55 mmHg).      Current medications:  Scheduled Meds:ceFAZolin, 2,000 mg, Intravenous, Q8H  cholecalciferol, 1,000 Units, Oral, Daily  folic acid, 1 mg, Oral, Daily  furosemide, 20 mg, Intravenous, Q12H  heparin (porcine), 5,000 Units, Subcutaneous, Q8H  multivitamin with minerals, 1 tablet, Oral, Daily  nicotine, 1 patch, Transdermal, Q24H  sodium chloride, 10 mL, Intravenous, Q12H  thiamine (B-1) IV, 100 mg, Intravenous, Daily      Continuous Infusions:     PRN Meds:.  Calcium Replacement - Follow Nurse / BPA Driven Protocol    Magnesium Standard Dose Replacement - Follow Nurse / BPA Driven Protocol    nitroglycerin    ondansetron    Phosphorus Replacement - Follow Nurse / BPA Driven Protocol    Potassium Replacement - Follow Nurse / BPA Driven Protocol    [COMPLETED] Insert Peripheral IV **AND** sodium chloride    sodium chloride    sodium chloride    traMADol    Assessment & Plan   Assessment & Plan     Active Hospital Problems    Diagnosis  POA    **Acute respiratory failure with hypoxia [J96.01]  Unknown    Ascites [R18.8]  Yes    Liver disease [K76.9]  Yes    Cellulitis [L03.90]  Yes    Hypertensive urgency [I16.0]  Yes    Fluid overload, unspecified [E87.70]  Yes      Resolved Hospital Problems   No resolved problems to display.        Brief Hospital Course to date:  Camron James is a 68 y.o.  male who presented with acute respiratory failure with hypoxia secondary to volume overload.  Imaging reveals pulmonary edema on chest x-ray.  CT abdomen pelvis revealed chronic liver disease with ascites and anasarca.  There is also concern for bilateral lower extremity edema and cellulitis.    Acute respiratory failure with hypoxia  Volume overload  Anasarca  Pulmonary edema  -Received IV Lasix 80 mg in the ED  -Continue Lasix 20 mg IV twice daily  -Echo with EF 56-60  -Continue O2 to maintain sats greater than 90, currently on 3.5 L nasal cannula (no home oxygen requirement)  -BMP pending today    Bilateral lower extremity edema/cellulitis  -Continue cefazolin  -Blood cultures pending  -Wound care consult requested  -Continue Lasix    Hypertensive urgency  Hypertensive encephalopathy, improved  -Blood pressure improved after Lasix  -Continue labetalol as needed    Chronic liver disease with ascites and splenomegaly  -GI consulted    Hypokalemia  -Replace per protocol    Deconditioning  Physical debility  -PT and OT recommended inpatient rehab      Expected Discharge Location and Transportation: Inpatient rehab  Expected Discharge   Expected Discharge Date: 1/3/2024; Expected Discharge Time:      DVT prophylaxis:  Medical DVT prophylaxis orders are present.     AM-PAC 6 Clicks Score (PT): 11 (01/01/24 0908)    CODE STATUS:   Code Status and Medical Interventions:   Ordered at: 12/29/23 3083     Level Of Support Discussed With:    Patient     Code Status (Patient has no pulse and is not breathing):    CPR (Attempt to Resuscitate)     Medical Interventions (Patient has pulse or is breathing):    Full Support       Kathi Vaughn MD  01/01/24

## 2024-01-02 PROCEDURE — 25010000002 HEPARIN (PORCINE) PER 1000 UNITS: Performed by: INTERNAL MEDICINE

## 2024-01-02 PROCEDURE — 25010000002 THIAMINE PER 100 MG: Performed by: NURSE PRACTITIONER

## 2024-01-02 PROCEDURE — 25010000002 FUROSEMIDE PER 20 MG: Performed by: INTERNAL MEDICINE

## 2024-01-02 PROCEDURE — 25010000002 CEFAZOLIN PER 500 MG: Performed by: FAMILY MEDICINE

## 2024-01-02 RX ORDER — LISINOPRIL 20 MG/1
20 TABLET ORAL DAILY
Status: DISCONTINUED | OUTPATIENT
Start: 2024-01-02 | End: 2024-01-04 | Stop reason: HOSPADM

## 2024-01-02 RX ORDER — HYDROCHLOROTHIAZIDE 25 MG/1
25 TABLET ORAL DAILY
Status: DISCONTINUED | OUTPATIENT
Start: 2024-01-02 | End: 2024-01-04 | Stop reason: HOSPADM

## 2024-01-02 RX ORDER — DOXAZOSIN MESYLATE 4 MG/1
4 TABLET ORAL DAILY
COMMUNITY

## 2024-01-02 RX ORDER — ALLOPURINOL 300 MG/1
300 TABLET ORAL DAILY
COMMUNITY

## 2024-01-02 RX ORDER — ALLOPURINOL 300 MG/1
300 TABLET ORAL DAILY
Status: DISCONTINUED | OUTPATIENT
Start: 2024-01-02 | End: 2024-01-04 | Stop reason: HOSPADM

## 2024-01-02 RX ORDER — TERAZOSIN 5 MG/1
5 CAPSULE ORAL NIGHTLY
Status: DISCONTINUED | OUTPATIENT
Start: 2024-01-02 | End: 2024-01-04 | Stop reason: HOSPADM

## 2024-01-02 RX ORDER — METOPROLOL SUCCINATE 50 MG/1
50 TABLET, EXTENDED RELEASE ORAL DAILY
Status: DISCONTINUED | OUTPATIENT
Start: 2024-01-02 | End: 2024-01-04 | Stop reason: HOSPADM

## 2024-01-02 RX ORDER — HYDROCHLOROTHIAZIDE 25 MG/1
25 TABLET ORAL DAILY
COMMUNITY

## 2024-01-02 RX ORDER — LISINOPRIL 20 MG/1
20 TABLET ORAL DAILY
COMMUNITY

## 2024-01-02 RX ORDER — METOPROLOL SUCCINATE 50 MG/1
50 TABLET, EXTENDED RELEASE ORAL DAILY
COMMUNITY

## 2024-01-02 RX ADMIN — ALLOPURINOL 300 MG: 300 TABLET ORAL at 15:13

## 2024-01-02 RX ADMIN — HEPARIN SODIUM 5000 UNITS: 5000 INJECTION INTRAVENOUS; SUBCUTANEOUS at 15:13

## 2024-01-02 RX ADMIN — Medication 1 TABLET: at 08:32

## 2024-01-02 RX ADMIN — SODIUM CHLORIDE 2000 MG: 900 INJECTION INTRAVENOUS at 15:13

## 2024-01-02 RX ADMIN — SODIUM CHLORIDE 2000 MG: 900 INJECTION INTRAVENOUS at 20:45

## 2024-01-02 RX ADMIN — Medication 10 ML: at 20:46

## 2024-01-02 RX ADMIN — SODIUM CHLORIDE 2000 MG: 900 INJECTION INTRAVENOUS at 05:58

## 2024-01-02 RX ADMIN — LISINOPRIL 20 MG: 20 TABLET ORAL at 15:13

## 2024-01-02 RX ADMIN — HEPARIN SODIUM 5000 UNITS: 5000 INJECTION INTRAVENOUS; SUBCUTANEOUS at 20:45

## 2024-01-02 RX ADMIN — HYDROCHLOROTHIAZIDE 25 MG: 25 TABLET ORAL at 15:12

## 2024-01-02 RX ADMIN — METOPROLOL SUCCINATE 50 MG: 50 TABLET, EXTENDED RELEASE ORAL at 15:13

## 2024-01-02 RX ADMIN — FUROSEMIDE 20 MG: 10 INJECTION, SOLUTION INTRAMUSCULAR; INTRAVENOUS at 05:58

## 2024-01-02 RX ADMIN — NICOTINE 1 PATCH: 14 PATCH, EXTENDED RELEASE TRANSDERMAL at 06:00

## 2024-01-02 RX ADMIN — THIAMINE HYDROCHLORIDE 100 MG: 100 INJECTION, SOLUTION INTRAMUSCULAR; INTRAVENOUS at 08:32

## 2024-01-02 RX ADMIN — FUROSEMIDE 20 MG: 10 INJECTION, SOLUTION INTRAMUSCULAR; INTRAVENOUS at 17:49

## 2024-01-02 RX ADMIN — FOLIC ACID 1 MG: 1 TABLET ORAL at 08:32

## 2024-01-02 RX ADMIN — Medication 10 ML: at 08:33

## 2024-01-02 RX ADMIN — TERAZOSIN HYDROCHLORIDE 5 MG: 5 CAPSULE ORAL at 20:45

## 2024-01-02 RX ADMIN — HEPARIN SODIUM 5000 UNITS: 5000 INJECTION INTRAVENOUS; SUBCUTANEOUS at 05:58

## 2024-01-02 RX ADMIN — Medication 1000 UNITS: at 08:32

## 2024-01-02 NOTE — PROGRESS NOTES
[facility] Medicine Services  PROGRESS NOTE    Patient Name: Camron James  : 1955  MRN: [id number]    Date of Admission: 2023  Primary Care Physician: Senthil Lazar MD    Subjective   Subjective     CC:  Hypoxia    HPI:  Resting in chair in no acute distress does not have any specific complaint.  Patient is somewhat concerned about what GI has told him about his liver.  No fever or chills.  No chest pain or palpitation or shortness of breath.  No nausea vomiting or diarrhea.      Objective   Objective     Vital Signs:   Temp:  [97.2 °F (36.2 °C)-98.6 °F (37 °C)] 98.6 °F (37 °C)  Heart Rate:  [63-81] 68  Resp:  [18] 18  BP: (159-180)/(68-89) 167/89  Flow (L/min):  [3.5] 3.5     Physical Exam:  Constitutional: No acute distress, awake, alert  HENT: NCAT, mucous membranes moist  Respiratory: Clear to auscultation bilaterally, decreased breath sound bilaterally, breathing without labor.  Cardiovascular: RRR, no murmur gallop or rub audible.  Gastrointestinal: Very obese.  Positive bowel sounds, soft, mildly distended  Musculoskeletal: Bilateral lower extremity edema with erythema and skin thickening bilaterally  Psychiatric: Appropriate affect, cooperative  Neurologic: Oriented x 3, generally weak, Cranial Nerves grossly intact to confrontation, speech clear  Skin: No rash      Results Reviewed:  LAB RESULTS:      Lab 23  0916 23   WBC  --  10.05 13.32*   HEMOGLOBIN  --  12.2* 14.3   HEMATOCRIT  --  36.8* 43.4   PLATELETS  --  301 326   NEUTROS ABS  --  7.46* 10.89*   IMMATURE GRANS (ABS)  --  0.05 0.07*   LYMPHS ABS  --  1.39 1.14   MONOS ABS  --  0.74 0.85   EOS ABS  --  0.35 0.32   MCV  --  86.0 87.5   PROCALCITONIN  --   --  0.07   LACTATE  --   --  1.6   PROTIME 14.3  --   --          Lab 24  1436 23  0451 23  1640 23   SODIUM 138  --   --  139 135*   POTASSIUM 4.4 3.7 3.4* 3.1*  3.1*  2.9*   CHLORIDE 97*  --   --  97* 94*   CO2 36.0*  --   --  33.0* 33.0*   ANION GAP 5.0  --   --  9.0 8.0   BUN 18  --   --  10 11   CREATININE 1.02  --   --  0.85 0.90   EGFR 80.1  --   --  94.6 93.0   GLUCOSE 121*  --   --  106* 121*   CALCIUM 8.2*  --   --  8.0* 8.7   MAGNESIUM  --   --   --  2.2 1.4*   HEMOGLOBIN A1C  --   --   --  5.10  --    TSH  --   --   --  2.360  --          Lab 12/30/23 0414 12/29/23 2015   TOTAL PROTEIN 5.4* 6.6   ALBUMIN 2.5* 3.0*   GLOBULIN 2.9 3.6   ALT (SGPT) 9 10   AST (SGOT) 27 27   BILIRUBIN 0.9 1.1   ALK PHOS 81 103         Lab 12/30/23  0916 12/30/23  0630 12/30/23 0414 12/29/23 2015   PROBNP  --   --   --  2,572.0*   HSTROP T  --  40* 35* 31*   PROTIME 14.3  --   --   --    INR 1.10  --   --   --          Lab 12/30/23 0414   CHOLESTEROL 107   LDL CHOL 47   HDL CHOL 47   TRIGLYCERIDES 59             Lab 12/29/23  2146   PH, ARTERIAL 7.459*   PCO2, ARTERIAL 51.0*   PO2 ART 85.8   FIO2 28   HCO3 ART 36.2*   BASE EXCESS ART 10.5*   CARBOXYHEMOGLOBIN 4.3*     Brief Urine Lab Results  (Last result in the past 365 days)        Color   Clarity   Blood   Leuk Est   Nitrite   Protein   CREAT   Urine HCG        12/29/23 2231 Yellow   Clear   Trace   Negative   Negative   30 mg/dL (1+)                   Microbiology Results Abnormal       Procedure Component Value - Date/Time    Blood Culture - Blood, Arm, Left [[id number]]  (Normal) Collected: 12/30/23 0047    Lab Status: Preliminary result Specimen: Blood from Arm, Left Updated: 01/02/24 0715     Blood Culture No growth at 3 days    Blood Culture - Blood, Arm, Right [[id number]]  (Normal) Collected: 12/30/23 0047    Lab Status: Preliminary result Specimen: Blood from Arm, Right Updated: 01/02/24 0715     Blood Culture No growth at 3 days    MRSA Screen, PCR (Inpatient) - Swab, Nares [[id number]]  (Normal) Collected: 12/30/23 0136    Lab Status: Final result Specimen: Swab from Nares Updated: 12/30/23 0811     MRSA PCR Negative     Narrative:      The negative predictive value of this diagnostic test is high and should only be used to consider de-escalating anti-MRSA therapy. A positive result may indicate colonization with MRSA and must be correlated clinically.  MRSA Negative    COVID PRE-OP / PRE-PROCEDURE SCREENING ORDER (NO ISOLATION) - Swab, Nasopharynx [[id number]]  (Normal) Collected: 12/29/23 2015    Lab Status: Final result Specimen: Swab from Nasopharynx Updated: 12/29/23 2058    Narrative:      The following orders were created for panel order COVID PRE-OP / PRE-PROCEDURE SCREENING ORDER (NO ISOLATION) - Swab, Nasopharynx.  Procedure                               Abnormality         Status                     ---------                               -----------         ------                     COVID-19 and FLU A/B PCR...[[id number]]  Normal              Final result                 Please view results for these tests on the individual orders.    COVID-19 and FLU A/B PCR, 1 HR TAT - Swab, Nasopharynx [[id number]]  (Normal) Collected: 12/29/23 2015    Lab Status: Final result Specimen: Swab from Nasopharynx Updated: 12/29/23 2058     COVID19 Not Detected     Influenza A PCR Not Detected     Influenza B PCR Not Detected    Narrative:      Fact sheet for providers: https://www.fda.gov/media/743412/download    Fact sheet for patients: https://www.fda.gov/media/245516/download    Test performed by PCR.            US Liver    Result Date: 1/1/2024  EXAMINATION: US LIVER DATE OF EXAM:1/1/2024 9:37 AM EST INDICATION: New Dx Cirrhosis; HCC surveillance. COMPARISON: CT abdomen and pelvis without contrast 12/29/2023 TECHNIQUE: Sonographic grayscale, color Doppler, and spectral Doppler images of the liver were obtained with representative examples submitted to PACS for interpretation. 2D shear wave elastography (2D-SWE) of the liver was performed using Siemens Virtual Touch software/hardware. Reference Guidelines from: Chinmay MONTANA, Carson RENTERIA, José Luis  LUIS, et al. Update to the Society of Radiologists in Ultrasound Liver Elastography Consensus Statement. Radiology. Volume 296:Number2-August 2020; p.263-274 Chinmay RG, Brad G, Ulysses M, et al. Elastography Assessment of Liver Fibrosis: Society of Radiologists in Ultrasound Consensus Conference Statement. Radiology. Volume 276:Number 3-September 2015; p. [phone]. FINDINGS: US LIVER: Background liver parenchyma is mildly coarsened which may relate to chronic liver disease. No discrete liver lesion. No perihepatic ascites. There is hepatopetal flow in the portal vein. Visualized hepatic veins are patent. Gallbladder present. Negative for cholelithiasis. No pericholecystic fluid. Common bile duct measures 4 mm. The right kidney measures 10.6 x 5.3 cm. No right-sided hydronephrosis. US ELASTOGRAPHY: 10 shear wave speed measurements were obtained using a right intercostal approach. The IQR/Median was 0.24 which is >0.15 indicating a poor quality dataset. The variance in the measurement is large and therefore the accuracy of the measurement may be unreliable. The median liver shear wave speed is 1.99 m/s. This is suggestive of compensated advanced chronic liver disease but further testing is needed for confirmation.. NOTE: In some patients with NAFLD, the cutoff values for compensated advanced chronic liver disease may be lower and follow up or additional testing would be recommended with those between 1.5 and 1.7 m/s. Several conditions can increase liver stiffness unrelated to liver fibrosis including acute hepatitis, liver inflammation, ALT levels >5x normal, obstructive cholestasis, hepatic congestion, and infiltrative liver diseases such as amyloidosis, lymphoma, or extramedullary hematopoiesis. In all these conditions, however, stiffness values within the normal range exclude significant fibrosis.     Impression: 1. Median liver shear wave speed is 1.99 m/s. This is suggestive of compensated advanced chronic liver  disease but further testing is needed for confirmation. 2. Coarsened hepatic parenchyma suggesting chronic liver disease. Electronically Signed: Galen Varela MD  1/1/2024 6:23 PM EST  Workstation ID: [id number]     Results for orders placed during the hospital encounter of 12/29/23    Adult Transthoracic Echo Complete w/ Color, Spectral and Contrast if necessary per protocol    Interpretation Summary    Left ventricular systolic function is normal. Left ventricular ejection fraction appears to be 56 - 60%.    Left ventricular wall thickness is consistent with mild concentric hypertrophy.    Left ventricular diastolic function was normal.    The right ventricular cavity is borderline dilated.    The left atrial cavity is mildly dilated.    The right atrial cavity is borderline dilated.    Moderate tricuspid valve regurgitation is present.    Estimated right ventricular systolic pressure from tricuspid regurgitation is markedly elevated (>55 mmHg).      Current medications:  Scheduled Meds:allopurinol, 300 mg, Oral, Daily  ceFAZolin, 2,000 mg, Intravenous, Q8H  cholecalciferol, 1,000 Units, Oral, Daily  folic acid, 1 mg, Oral, Daily  furosemide, 20 mg, Intravenous, Q12H  heparin (porcine), 5,000 Units, Subcutaneous, Q8H  hydroCHLOROthiazide, 25 mg, Oral, Daily  lisinopril, 20 mg, Oral, Daily  metoprolol succinate XL, 50 mg, Oral, Daily  multivitamin with minerals, 1 tablet, Oral, Daily  nicotine, 1 patch, Transdermal, Q24H  sodium chloride, 10 mL, Intravenous, Q12H  terazosin, 5 mg, Oral, Nightly  thiamine (B-1) IV, 100 mg, Intravenous, Daily      Continuous Infusions:     PRN Meds:.  Calcium Replacement - Follow Nurse / BPA Driven Protocol    Magnesium Standard Dose Replacement - Follow Nurse / BPA Driven Protocol    nitroglycerin    ondansetron    Phosphorus Replacement - Follow Nurse / BPA Driven Protocol    Potassium Replacement - Follow Nurse / BPA Driven Protocol    [COMPLETED] Insert Peripheral IV **AND** sodium  chloride    sodium chloride    sodium chloride    traMADol    Assessment & Plan   Assessment & Plan     Active Hospital Problems    Diagnosis  POA    **Acute respiratory failure with hypoxia [J96.01]  Unknown    Ascites [R18.8]  Yes    Liver disease [K76.9]  Yes    Cellulitis [L03.90]  Yes    Hypertensive urgency [I16.0]  Yes    Fluid overload, unspecified [E87.70]  Yes      Resolved Hospital Problems   No resolved problems to display.        Brief Hospital Course to date:  Camron James is a 68 y.o. male who presented with acute respiratory failure with hypoxia secondary to volume overload.  Imaging reveals pulmonary edema on chest x-ray.  CT abdomen pelvis revealed chronic liver disease with ascites and anasarca.  There is also concern for bilateral lower extremity edema and cellulitis.    Acute respiratory failure with hypoxia  Volume overload  Anasarca  Pulmonary edema  -Received IV Lasix 80 mg in the ED  -Continue Lasix   -Echo with EF 56-60  -Continue O2 to maintain sats greater than 90, currently on 3.5 L nasal cannula (no home oxygen requirement)      Bilateral lower extremity edema/cellulitis  -Continue cefazolin    Hypertensive urgency  Hypertensive encephalopathy, improved  -Blood pressure improved after Lasix  -Continue labetalol as needed    Chronic liver disease with ascites and splenomegaly  -GI has seen and evaluated patient.  Follow labs and GI recommendation.      Hypokalemia  -Replace per protocol    Deconditioning  Physical debility  -PT and OT recommended inpatient rehab      Expected Discharge Location and Transportation: Inpatient rehab  Expected Discharge   Expected Discharge Date: 1/3/2024; Expected Discharge Time:      DVT prophylaxis:  Medical DVT prophylaxis orders are present.     AM-PAC 6 Clicks Score (PT): 14 (01/02/24 0800)    CODE STATUS:   Code Status and Medical Interventions:   Ordered at: 12/29/23 6774     Level Of Support Discussed With:    Patient     Code Status  (Patient has no pulse and is not breathing):    CPR (Attempt to Resuscitate)     Medical Interventions (Patient has pulse or is breathing):    Full Support       Aamir Smith MD  01/02/24

## 2024-01-02 NOTE — PLAN OF CARE
Goal Outcome Evaluation:         Pt in chair during shift, no complaints, 3.5L NC IV abx, home med list obtained from pt's pharmacy and medications restarted by attending MD, continue diuresing

## 2024-01-02 NOTE — NURSING NOTE
WOC consult for:    bilateral LE nodularity, some areas of cracked skin, cellulitic changes     Visited patient while he was up in chair.  Patient presents with significant venous stasis dermatitis and evidence of venous insufficiency bilateral lower extremities.  Patient states he has switched primary care doctors and was unable to get his diuretics.  At that point he became too swollen in his abdomen and was unable to place his compression stockings.  Is agreeable to bilateral lower extremity compression wraps and states he will ask his wife to apply compression stockings returns home.  Gave recommendations on venous stasis dermatitis care which includes cleansing applying good moisturizer and using steroid cream to prevent scratching when there is pruritus.     Noted POA Gluteal pressure injury in LDAs.  Unable to assess at this time due to patient being in chair.  Please offload with chair cushion and waffle mattress and turn every 2.  Will attempt to see at a later date.

## 2024-01-02 NOTE — CASE MANAGEMENT/SOCIAL WORK
Discharge Planning Assessment  [facility]     Patient Name: Camron James  MRN: [id number]  Today's Date: 1/2/2024    Admit Date: 12/29/2023    Plan: Home   Discharge Needs Assessment       Row Name 01/02/24 1324       Living Environment    People in Home alone;spouse    Unique Family Situation Vishal living in his parents home five minutes from his wife who he see or speaks to daily    Current Living Arrangements home    Potentially Unsafe Housing Conditions none    Primary Care Provided by self    Provides Primary Care For no one    Family Caregiver if Needed spouse    Family Caregiver Names Tessa Diazlondon    Quality of Family Relationships unable to assess    Able to Return to Prior Arrangements yes       Resource/Environmental Concerns    Resource/Environmental Concerns none       Transition Planning    Patient/Family Anticipates Transition to home    Patient/Family Anticipated Services at Transition none    Transportation Anticipated family or friend will provide       Discharge Needs Assessment    Readmission Within the Last 30 Days no previous admission in last 30 days    Equipment Currently Used at Home cane, straight;grab bar;shower chair    Concerns to be Addressed denies needs/concerns at this time;discharge planning    Anticipated Changes Related to Illness none    Equipment Needed After Discharge none    Discharge Coordination/Progress Home                   Discharge Plan       Row Name 01/02/24 1326       Plan    Plan Home    Patient/Family in Agreement with Plan yes    Plan Comments Spoke with patient at bedside regarding discharge planning.  Patient denies use of HH and reports he has a Straight Cane, Grab Bars in the bathroom and Shower Chair.  He reports having prescription coverage and medications are affordable.  Patient indicates that he and his wife remain together but he has been living in his parents house which is only five minutes away and they speak daily.  Patient indicates  "he is independent and prepares his own medications daily.  Patient reports that a doctor came to see him about his liver and was told some news that scared the \"blank\" out of him and does not understand why this doctor has the right to speak to his medical case.  CM advised that any doctor seeing him other than the hospitalist has been consulted related to their specialty.  Patient reports he has looked on the internet at what he was told was his condition and his prognosis is better than he was told.  No immediate discharge needs verbalized.  CM following.  Patient plan is to discharge home via car with family to transport.    Final Discharge Disposition Code 01 - home or self-care                  Continued Care and Services - Admitted Since 12/29/2023    Coordination has not been started for this encounter.       Expected Discharge Date and Time       Expected Discharge Date Expected Discharge Time    Claudio 3, 2024            Demographic Summary       Row Name 01/02/24 1323       General Information    Admission Type inpatient    Arrived From home    Referral Source admission list    Reason for Consult discharge planning    Preferred Language English    General Information Comments Senthil Lazar MD       Contact Information    Permission Granted to Share Info With     Contact Information Comments Tessa Ayon, spouse  [phone]                   Functional Status       Row Name 01/02/24 1324       Functional Status    Usual Activity Tolerance good    Current Activity Tolerance good       Functional Status, IADL    Medications independent    Meal Preparation assistive equipment    Housekeeping assistive equipment    Laundry assistive equipment    Shopping assistive equipment       Employment/    Employment/ Comments Medicare/[employer]                   Psychosocial    No documentation.                  Abuse/Neglect    No documentation.                  Legal    No " documentation.                  Substance Abuse    No documentation.                  Patient Forms    No documentation.                     Kamla Fishman RN

## 2024-01-03 LAB
DEPRECATED RDW RBC AUTO: 45.7 FL (ref 37–54)
ERYTHROCYTE [DISTWIDTH] IN BLOOD BY AUTOMATED COUNT: 13.9 % (ref 12.3–15.4)
HAV AB SER QL IA: NEGATIVE
HBV SURFACE AB SER-ACNC: <3.1 MIU/ML
HCT VFR BLD AUTO: 37.3 % (ref 37.5–51)
HGB BLD-MCNC: 11.8 G/DL (ref 13–17.7)
MCH RBC QN AUTO: 28.5 PG (ref 26.6–33)
MCHC RBC AUTO-ENTMCNC: 31.6 G/DL (ref 31.5–35.7)
MCV RBC AUTO: 90.1 FL (ref 79–97)
PLATELET # BLD AUTO: 254 10*3/MM3 (ref 140–450)
PMV BLD AUTO: 9.9 FL (ref 6–12)
RBC # BLD AUTO: 4.14 10*6/MM3 (ref 4.14–5.8)
WBC NRBC COR # BLD AUTO: 10.29 10*3/MM3 (ref 3.4–10.8)

## 2024-01-03 PROCEDURE — 29581 APPL MULTLAYER CMPRN SYS LEG: CPT

## 2024-01-03 PROCEDURE — 97164 PT RE-EVAL EST PLAN CARE: CPT

## 2024-01-03 PROCEDURE — 25010000002 CEFAZOLIN PER 500 MG: Performed by: INTERNAL MEDICINE

## 2024-01-03 PROCEDURE — 25010000002 FUROSEMIDE PER 20 MG: Performed by: INTERNAL MEDICINE

## 2024-01-03 PROCEDURE — 85027 COMPLETE CBC AUTOMATED: CPT

## 2024-01-03 PROCEDURE — 25010000002 HEPARIN (PORCINE) PER 1000 UNITS: Performed by: INTERNAL MEDICINE

## 2024-01-03 PROCEDURE — 25010000002 CEFAZOLIN PER 500 MG: Performed by: FAMILY MEDICINE

## 2024-01-03 PROCEDURE — 25010000002 THIAMINE PER 100 MG: Performed by: NURSE PRACTITIONER

## 2024-01-03 PROCEDURE — 97597 DBRDMT OPN WND 1ST 20 CM/<: CPT

## 2024-01-03 RX ORDER — MELATONIN
5000 DAILY
Status: DISCONTINUED | OUTPATIENT
Start: 2024-01-03 | End: 2024-01-04 | Stop reason: HOSPADM

## 2024-01-03 RX ADMIN — METOPROLOL SUCCINATE 50 MG: 50 TABLET, EXTENDED RELEASE ORAL at 08:40

## 2024-01-03 RX ADMIN — HEPARIN SODIUM 5000 UNITS: 5000 INJECTION INTRAVENOUS; SUBCUTANEOUS at 13:59

## 2024-01-03 RX ADMIN — FOLIC ACID 1 MG: 1 TABLET ORAL at 08:40

## 2024-01-03 RX ADMIN — FUROSEMIDE 20 MG: 10 INJECTION, SOLUTION INTRAMUSCULAR; INTRAVENOUS at 05:29

## 2024-01-03 RX ADMIN — LISINOPRIL 20 MG: 20 TABLET ORAL at 08:40

## 2024-01-03 RX ADMIN — Medication 5000 UNITS: at 17:25

## 2024-01-03 RX ADMIN — HEPARIN SODIUM 5000 UNITS: 5000 INJECTION INTRAVENOUS; SUBCUTANEOUS at 21:22

## 2024-01-03 RX ADMIN — Medication 10 ML: at 21:22

## 2024-01-03 RX ADMIN — Medication 1000 UNITS: at 08:40

## 2024-01-03 RX ADMIN — SODIUM CHLORIDE 2000 MG: 900 INJECTION INTRAVENOUS at 21:22

## 2024-01-03 RX ADMIN — TERAZOSIN HYDROCHLORIDE 5 MG: 5 CAPSULE ORAL at 21:22

## 2024-01-03 RX ADMIN — NICOTINE 1 PATCH: 14 PATCH, EXTENDED RELEASE TRANSDERMAL at 05:30

## 2024-01-03 RX ADMIN — ALLOPURINOL 300 MG: 300 TABLET ORAL at 08:40

## 2024-01-03 RX ADMIN — FUROSEMIDE 20 MG: 10 INJECTION, SOLUTION INTRAMUSCULAR; INTRAVENOUS at 17:25

## 2024-01-03 RX ADMIN — SODIUM CHLORIDE 2000 MG: 900 INJECTION INTRAVENOUS at 14:00

## 2024-01-03 RX ADMIN — THIAMINE HYDROCHLORIDE 100 MG: 100 INJECTION, SOLUTION INTRAMUSCULAR; INTRAVENOUS at 08:40

## 2024-01-03 RX ADMIN — HEPARIN SODIUM 5000 UNITS: 5000 INJECTION INTRAVENOUS; SUBCUTANEOUS at 05:29

## 2024-01-03 RX ADMIN — HYDROCHLOROTHIAZIDE 25 MG: 25 TABLET ORAL at 08:40

## 2024-01-03 RX ADMIN — Medication 1 TABLET: at 08:40

## 2024-01-03 RX ADMIN — SODIUM CHLORIDE 2000 MG: 900 INJECTION INTRAVENOUS at 05:29

## 2024-01-03 NOTE — PROGRESS NOTES
[facility]  PROGRESS NOTE    Patient Name: Camron James  : 1955  MRN: [id number]    Date of Admission: 2023  Primary Care Physician: Senthil Lazar MD    Subjective   Subjective     CC:  Hypoxia    HPI:  Resting in chair in no acute distress does not have any specific complaint.  Feels that he has improved.  No fever or chills.  No chest pain or palpitation or shortness of breath.  No nausea vomiting or diarrhea.      Objective   Objective     Vital Signs:   Temp:  [98.2 °F (36.8 °C)-98.6 °F (37 °C)] 98.3 °F (36.8 °C)  Heart Rate:  [58-68] 58  Resp:  [16-18] 18  BP: (129-159)/(61-77) 137/64  Flow (L/min):  [2-3.5] 2     Physical Exam:  Constitutional: No acute distress, awake, alert  HENT: NCAT, mucous membranes moist  Respiratory: Clear to auscultation bilaterally, decreased breath sound bilaterally, breathing without labor.  Cardiovascular: RRR, no murmur gallop or rub audible.  Gastrointestinal: Very obese.  Positive bowel sounds, soft, mildly distended  Musculoskeletal: Bilateral lower extremity edema with erythema and skin thickening bilaterally  Psychiatric: Appropriate affect, cooperative  Neurologic: Oriented x 3, generally weak, Cranial Nerves grossly intact to confrontation, speech clear  Skin: No rash      Results Reviewed:  LAB RESULTS:      Lab 24  0436 23  0916 23  0414 23   WBC 10.29  --  10.05 13.32*   HEMOGLOBIN 11.8*  --  12.2* 14.3   HEMATOCRIT 37.3*  --  36.8* 43.4   PLATELETS 254  --  301 326   NEUTROS ABS  --   --  7.46* 10.89*   IMMATURE GRANS (ABS)  --   --  0.05 0.07*   LYMPHS ABS  --   --  1.39 1.14   MONOS ABS  --   --  0.74 0.85   EOS ABS  --   --  0.35 0.32   MCV 90.1  --  86.0 87.5   PROCALCITONIN  --   --   --  0.07   LACTATE  --   --   --  1.6   PROTIME  --  14.3  --   --          Lab 24  1436 23  0451 23  1640 23  0414 23  2015   SODIUM 138  --   --  139  135*   POTASSIUM 4.4 3.7 3.4* 3.1*  3.1* 2.9*   CHLORIDE 97*  --   --  97* 94*   CO2 36.0*  --   --  33.0* 33.0*   ANION GAP 5.0  --   --  9.0 8.0   BUN 18  --   --  10 11   CREATININE 1.02  --   --  0.85 0.90   EGFR 80.1  --   --  94.6 93.0   GLUCOSE 121*  --   --  106* 121*   CALCIUM 8.2*  --   --  8.0* 8.7   MAGNESIUM  --   --   --  2.2 1.4*   HEMOGLOBIN A1C  --   --   --  5.10  --    TSH  --   --   --  2.360  --          Lab 12/30/23  0414 12/29/23 2015   TOTAL PROTEIN 5.4* 6.6   ALBUMIN 2.5* 3.0*   GLOBULIN 2.9 3.6   ALT (SGPT) 9 10   AST (SGOT) 27 27   BILIRUBIN 0.9 1.1   ALK PHOS 81 103         Lab 12/30/23  0916 12/30/23  0630 12/30/23  0414 12/29/23 2015   PROBNP  --   --   --  2,572.0*   HSTROP T  --  40* 35* 31*   PROTIME 14.3  --   --   --    INR 1.10  --   --   --          Lab 12/30/23 0414   CHOLESTEROL 107   LDL CHOL 47   HDL CHOL 47   TRIGLYCERIDES 59             Lab 12/29/23  2146   PH, ARTERIAL 7.459*   PCO2, ARTERIAL 51.0*   PO2 ART 85.8   FIO2 28   HCO3 ART 36.2*   BASE EXCESS ART 10.5*   CARBOXYHEMOGLOBIN 4.3*     Brief Urine Lab Results  (Last result in the past 365 days)        Color   Clarity   Blood   Leuk Est   Nitrite   Protein   CREAT   Urine HCG        12/29/23 2231 Yellow   Clear   Trace   Negative   Negative   30 mg/dL (1+)                   Microbiology Results Abnormal       Procedure Component Value - Date/Time    Blood Culture - Blood, Arm, Left [[id number]]  (Normal) Collected: 12/30/23 0047    Lab Status: Preliminary result Specimen: Blood from Arm, Left Updated: 01/03/24 0715     Blood Culture No growth at 4 days    Blood Culture - Blood, Arm, Right [[id number]]  (Normal) Collected: 12/30/23 0047    Lab Status: Preliminary result Specimen: Blood from Arm, Right Updated: 01/03/24 0715     Blood Culture No growth at 4 days    MRSA Screen, PCR (Inpatient) - Swab, Nares [[id number]]  (Normal) Collected: 12/30/23 0136    Lab Status: Final result Specimen: Swab from Nares Updated:  12/30/23 0811     MRSA PCR Negative    Narrative:      The negative predictive value of this diagnostic test is high and should only be used to consider de-escalating anti-MRSA therapy. A positive result may indicate colonization with MRSA and must be correlated clinically.  MRSA Negative    COVID PRE-OP / PRE-PROCEDURE SCREENING ORDER (NO ISOLATION) - Swab, Nasopharynx [[id number]]  (Normal) Collected: 12/29/23 2015    Lab Status: Final result Specimen: Swab from Nasopharynx Updated: 12/29/23 2058    Narrative:      The following orders were created for panel order COVID PRE-OP / PRE-PROCEDURE SCREENING ORDER (NO ISOLATION) - Swab, Nasopharynx.  Procedure                               Abnormality         Status                     ---------                               -----------         ------                     COVID-19 and FLU A/B PCR...[[id number]]  Normal              Final result                 Please view results for these tests on the individual orders.    COVID-19 and FLU A/B PCR, 1 HR TAT - Swab, Nasopharynx [[id number]]  (Normal) Collected: 12/29/23 2015    Lab Status: Final result Specimen: Swab from Nasopharynx Updated: 12/29/23 2058     COVID19 Not Detected     Influenza A PCR Not Detected     Influenza B PCR Not Detected    Narrative:      Fact sheet for providers: https://www.fda.gov/media/608186/download    Fact sheet for patients: https://www.fda.gov/media/651224/download    Test performed by PCR.            US Liver    Result Date: 1/1/2024  EXAMINATION: US LIVER DATE OF EXAM:1/1/2024 9:37 AM EST INDICATION: New Dx Cirrhosis; HCC surveillance. COMPARISON: CT abdomen and pelvis without contrast 12/29/2023 TECHNIQUE: Sonographic grayscale, color Doppler, and spectral Doppler images of the liver were obtained with representative examples submitted to PACS for interpretation. 2D shear wave elastography (2D-SWE) of the liver was performed using Siemens Virtual Touch software/hardware. Reference  Guidelines from: Chinmay MONTANA, Carson SR, José Luis D, et al. Update to the Society of Radiologists in Ultrasound Liver Elastography Consensus Statement. Radiology. Volume 296:Number2-August 2020; p.263-274 Chinmay MON[place], Brad G, Ulysses M, et al. Elastography Assessment of Liver Fibrosis: Society of Radiologists in Ultrasound Consensus Conference Statement. Radiology. Volume 276:Number 3-September 2015; p. [phone]. FINDINGS: US LIVER: Background liver parenchyma is mildly coarsened which may relate to chronic liver disease. No discrete liver lesion. No perihepatic ascites. There is hepatopetal flow in the portal vein. Visualized hepatic veins are patent. Gallbladder present. Negative for cholelithiasis. No pericholecystic fluid. Common bile duct measures 4 mm. The right kidney measures 10.6 x 5.3 cm. No right-sided hydronephrosis. US ELASTOGRAPHY: 10 shear wave speed measurements were obtained using a right intercostal approach. The IQR/Median was 0.24 which is >0.15 indicating a poor quality dataset. The variance in the measurement is large and therefore the accuracy of the measurement may be unreliable. The median liver shear wave speed is 1.99 m/s. This is suggestive of compensated advanced chronic liver disease but further testing is needed for confirmation.. NOTE: In some patients with NAFLD, the cutoff values for compensated advanced chronic liver disease may be lower and follow up or additional testing would be recommended with those between 1.5 and 1.7 m/s. Several conditions can increase liver stiffness unrelated to liver fibrosis including acute hepatitis, liver inflammation, ALT levels >5x normal, obstructive cholestasis, hepatic congestion, and infiltrative liver diseases such as amyloidosis, lymphoma, or extramedullary hematopoiesis. In all these conditions, however, stiffness values within the normal range exclude significant fibrosis.     Impression: 1. Median liver shear wave speed is 1.99 m/s. This is  suggestive of compensated advanced chronic liver disease but further testing is needed for confirmation. 2. Coarsened hepatic parenchyma suggesting chronic liver disease. Electronically Signed: Galen Varela MD  1/1/2024 6:23 PM EST  Workstation ID: [id number]     Results for orders placed during the hospital encounter of 12/29/23    Adult Transthoracic Echo Complete w/ Color, Spectral and Contrast if necessary per protocol    Interpretation Summary    Left ventricular systolic function is normal. Left ventricular ejection fraction appears to be 56 - 60%.    Left ventricular wall thickness is consistent with mild concentric hypertrophy.    Left ventricular diastolic function was normal.    The right ventricular cavity is borderline dilated.    The left atrial cavity is mildly dilated.    The right atrial cavity is borderline dilated.    Moderate tricuspid valve regurgitation is present.    Estimated right ventricular systolic pressure from tricuspid regurgitation is markedly elevated (>55 mmHg).      Current medications:  Scheduled Meds:allopurinol, 300 mg, Oral, Daily  ceFAZolin, 2,000 mg, Intravenous, Q8H  cholecalciferol, 1,000 Units, Oral, Daily  folic acid, 1 mg, Oral, Daily  furosemide, 20 mg, Intravenous, Q12H  heparin (porcine), 5,000 Units, Subcutaneous, Q8H  hydroCHLOROthiazide, 25 mg, Oral, Daily  lisinopril, 20 mg, Oral, Daily  metoprolol succinate XL, 50 mg, Oral, Daily  multivitamin with minerals, 1 tablet, Oral, Daily  nicotine, 1 patch, Transdermal, Q24H  sodium chloride, 10 mL, Intravenous, Q12H  terazosin, 5 mg, Oral, Nightly  thiamine (B-1) IV, 100 mg, Intravenous, Daily      Continuous Infusions:     PRN Meds:.  Calcium Replacement - Follow Nurse / BPA Driven Protocol    Magnesium Standard Dose Replacement - Follow Nurse / BPA Driven Protocol    nitroglycerin    ondansetron    Phosphorus Replacement - Follow Nurse / BPA Driven Protocol    Potassium Replacement - Follow Nurse / BPA Driven Protocol     [COMPLETED] Insert Peripheral IV **AND** sodium chloride    sodium chloride    sodium chloride    traMADol    Assessment & Plan   Assessment & Plan     Active Hospital Problems    Diagnosis  POA    **Acute respiratory failure with hypoxia [J96.01]  Unknown    Ascites [R18.8]  Yes    Liver disease [K76.9]  Yes    Cellulitis [L03.90]  Yes    Hypertensive urgency [I16.0]  Yes    Fluid overload, unspecified [E87.70]  Yes      Resolved Hospital Problems   No resolved problems to display.        Brief Hospital Course to date:  Camron James is a 68 y.o. male who presented with acute respiratory failure with hypoxia secondary to volume overload.  Imaging reveals pulmonary edema on chest x-ray.  CT abdomen pelvis revealed chronic liver disease with ascites and anasarca.  There is also concern for bilateral lower extremity edema and cellulitis.    Acute respiratory failure with hypoxia  Volume overload  Anasarca  Pulmonary edema  -Received IV Lasix 80 mg in the ED  -Continue Lasix   -Echo with EF 56-60  -Continue O2 to maintain sats greater than 90, currently on 3.5 L nasal cannula (no home oxygen requirement)  -- Overall symptoms have significantly improved.  Currently patient seems to be close to baseline.      Bilateral lower extremity edema/cellulitis  -Continue cefazolin    Hypertensive urgency  Hypertensive encephalopathy, improved  -Blood pressure improved after Lasix  --Currently on home medication for blood pressure and blood pressure is reasonably controlled.  -Continue labetalol as needed    Chronic liver disease with ascites and splenomegaly  -GI has seen and evaluated patient.   -- CT scan was concerning for chronic liver disease.  There was splenomegaly and abnormal liver texture on the CT scan.  Ultrasound of the liver also is concerning for chronic liver disease.  -- GI ordered some labs including testosterone which is low, vitamin D which is low.  GI has had the long conversation with the patient  about the condition.  Patient does have drinking habits and GI has addressed this issue.    Vitamin D deficiency  -25-hydroxy vitamin D is less than 6.0.  -Will start the patient on vitamin D treatment    Hypokalemia  -Replace per protocol    Deconditioning  Physical debility  -PT and OT recommended inpatient rehab          Expected Discharge Location and Transportation: Inpatient rehab  Expected Discharge   Expected Discharge Date: 1/3/2024; Expected Discharge Time:      DVT prophylaxis:  Medical DVT prophylaxis orders are present.     AM-PAC 6 Clicks Score (PT): 17 (01/03/24 4891)    CODE STATUS:   Code Status and Medical Interventions:   Ordered at: 12/29/23 9771     Level Of Support Discussed With:    Patient     Code Status (Patient has no pulse and is not breathing):    CPR (Attempt to Resuscitate)     Medical Interventions (Patient has pulse or is breathing):    Full Support       Aamir Smith MD  01/03/24

## 2024-01-03 NOTE — THERAPY RE-EVALUATION
Acute Care - Wound/Debridement Initial Evaluation  [facility]     Patient Name: Camron James  : 1955  MRN: [id number]  Today's Date: 1/3/2024                Admit Date: 2023    Visit Dx:    ICD-10-CM ICD-9-CM   1. Acute on chronic congestive heart failure, unspecified heart failure type  I50.9 428.0   2. Exertional dyspnea  R06.09 786.09   3. Hypoxia  R09.02 799.02   4. Pedal edema  R60.0 782.3   5. Scrotal edema  N50.89 608.86   6. Elevated blood pressure reading with diagnosis of hypertension  I10 401.9   7. Hypomagnesemia  E83.42 275.2   8. Hypokalemia  E87.6 276.8   9. History of obesity  Z86.39 V12.29   10. H/O noncompliance with medical treatment, presenting hazards to health  Z91.199 V15.81     BLE      R lateral leg        Patient Active Problem List   Diagnosis    Fluid overload, unspecified    Acute respiratory failure with hypoxia    Ascites    Liver disease    Cellulitis    Hypertensive urgency        Past Medical History:   Diagnosis Date    CHF (congestive heart failure)     Elevated cholesterol     Hypertension         History reviewed. No pertinent surgical history.        Wound 23 0845 gluteal Pressure Injury (Active)   Pressure Injury Stage 1 24 1600   Dressing Appearance open to air 24 1200   Closure Open to air 24 0845   Base clean;dry 24 1200   Periwound intact;blanchable;dry;pink;redness 24 1200   Periwound Temperature warm 24 1200   Periwound Skin Turgor soft 24 1200   Drainage Amount none 24 1600   Care, Wound barrier applied 24 0845   Dressing Care open to air 24 1600   Periwound Care dry periwound area maintained 24 1200       Wound 24 Right lateral leg Venous Ulcer (Active)   Wound Image   24 1313   Dressing Appearance open to air 24 1313   Base moist;dry;yellow;scab;slough 24 1313   Periwound dry;edematous;redness;swelling;warm 24 1313   Periwound Temperature warm  01/03/24 1313   Periwound Skin Turgor soft 01/03/24 1313   Edges irregular;open 01/03/24 1313   Wound Length (cm) 6.5 cm 01/03/24 1313   Wound Width (cm) 2.8 cm 01/03/24 1313   Wound Surface Area (cm^2) 18.2 cm^2 01/03/24 1313   Drainage Characteristics/Odor serosanguineous;yellow 01/03/24 1313   Drainage Amount scant 01/03/24 1313   Care, Wound cleansed with;soap and water;debrided 01/03/24 1313   Dressing Care dressing applied;petroleum-based;gauze;multi-layer wrap 01/03/24 1313   Periwound Care cleansed with pH balanced cleanser;dry periwound area maintained 01/03/24 1313      Lymphedema       Row Name 01/03/24 1400             Lymphedema Edema Assessment    Ptting Edema Category By severity  -      Pitting Edema Moderate;Severe  -         Skin Changes/Observations    Location/Assessment Upper Extremity  -      Upper Extremity Conditions bilateral:;dry;crust;inflamed  -      Upper Extremity Color/Pigment bilateral:;blanchable;red;erythema;brawny;woody;hyperpigmented;fibrosis  -         Lymphedema Pulses/Capillary Refill    Lymphedema Pulses/Capillary Refill lower extremity pulses;capillary refill  -      Dorsalis Pedis Pulse right:;left:;+2 normal  -      Capillary Refill lower extremity capillary refill  -      Lower Extremity Capillary Refill right:;left:;less than 3 seconds  -         Lymphedema Measurements    Measurement Type(s) Quick Girth  -      Quick Girth Areas Lower extremities  -         LLE Quick Girth (cm)    Mid foot 27.6 cm  -      Smallest ankle 27 cm  -      Largest calf 46.9 cm  -         RLE Quick Girth (cm)    Mid foot 27.6 cm  -LH      Smallest ankle 28.5 cm  -      Largest calf 46.7 cm  -      RLE Quick Girth Total 102.8  -         Compression/Skin Care    Compression/Skin Care skin care;wrapping location  -      Skin Care washed/dried;lotion applied  -      Wrapping Location lower extremity  -      Wrapping Location LE bilateral:;foot to knee  -       Wrapping Comments RLE xeroform and cast padding to secure, BLE size 4/5 compressogrips applied doubled and overlapping for gradient compression.  -                User Key  (r) = Recorded By, (t) = Taken By, (c) = Cosigned By      Initials Name Provider Type     Kiko Beal, PT Physical Therapist                    WOUND DEBRIDEMENT  Total area of Debridement: 8cm2  Debridement Site 1  Location- Site 1: RLE  Selective Debridement- Site 1: Wound Surface >20cmsq  Instruments- Site 1: tweezers  Excised Tissue Description- Site 1: moderate, slough, other (comment) (hypertrophic crusts, dried exudate)  Bleeding- Site 1: none               PT Assessment (last 12 hours)       PT Evaluation and Treatment       Row Name 01/03/24 1313          Physical Therapy Time and Intention    Subjective Information complains of;weakness;pain;swelling  -     Document Type evaluation;wound care  -     Mode of Treatment physical therapy;individual therapy  -       Row Name 01/03/24 3662          General Information    Patient Profile Reviewed yes  -     Patient Observations alert;cooperative;agree to therapy  -     Pertinent History of Current Functional Problem Pt with chronic history of BLE edema/venous stasis with BLE ulcerations. Pt with recent change to diuretic medication resulting in increased abdominal edema and inability to don personal BLE compression garments.  -     Risks Reviewed patient:;increased discomfort  -     Benefits Reviewed patient:;increase knowledge;improve skin integrity;decrease risk of DVT;decrease pain;improve function  -     Barriers to Rehab none identified  -       Row Name 01/03/24 1122          Pain    Pain Intervention(s) Repositioned;Elevated  -     Additional Documentation Pain Scale: FACES Pre/Post-Treatment (Group)  -       Row Name 01/03/24 4806          Pain Scale: FACES Pre/Post-Treatment    Pain: FACES Scale, Pretreatment 0-->no hurt  -     Posttreatment Pain  Rating 2-->hurts little bit  -LH     Pain Location - Side/Orientation Bilateral  -LH     Pain Location lower  -LH     Pain Location - extremity  -LH       Row Name 01/03/24 1313          Cognition    Affect/Mental Status (Cognition) WNL  -LH     Orientation Status (Cognition) oriented x 4  -LH       Row Name             Wound 12/30/23 0845 gluteal Pressure Injury    Wound - Properties Group Placement Date: 12/30/23  -CB Placement Time: 0845 -CB Location: gluteal  -CB Primary Wound Type: Pressure inj  -CB    Retired Wound - Properties Group Placement Date: 12/30/23  -CB Placement Time: 0845 -CB Location: gluteal  -CB Primary Wound Type: Pressure inj  -CB    Retired Wound - Properties Group Date first assessed: 12/30/23  -CB Time first assessed: 0845 -CB Location: gluteal  -CB Primary Wound Type: Pressure inj  -CB      Row Name 01/03/24 1313          Wound 01/03/24 Right lateral leg Venous Ulcer    Wound - Properties Group Placement Date: 01/03/24  -LH Present on Original Admission: Y  -LH Side: Right  -LH Orientation: lateral  -LH Location: leg  -LH Primary Wound Type: Venous ulcer  -LH    Wound Image Images linked: 1  -     Dressing Appearance open to air  -     Base moist;dry;yellow;scab;slough  -     Periwound dry;edematous;redness;swelling;warm  -     Periwound Temperature warm  -     Periwound Skin Turgor soft  -     Edges irregular;open  -     Wound Length (cm) 6.5 cm  -     Wound Width (cm) 2.8 cm  -     Wound Depth (cm) --  obscured  -     Wound Surface Area (cm^2) 18.2 cm^2  -     Drainage Characteristics/Odor serosanguineous;yellow  -     Drainage Amount scant  -     Care, Wound cleansed with;soap and water;debrided  -     Dressing Care dressing applied;petroleum-based;gauze;multi-layer wrap  Xeroform, cast padding, MLW  -     Periwound Care cleansed with pH balanced cleanser;dry periwound area maintained  -     Retired Wound - Properties Group Placement Date: 01/03/24   - Present on Original Admission: Y  -LH Side: Right  - Orientation: lateral  -LH Location: leg  -LH Primary Wound Type: Venous ulcer  -    Retired Wound - Properties Group Date first assessed: 01/03/24  - Present on Original Admission: Y  -LH Side: Right  -LH Location: leg  -LH Primary Wound Type: Venous ulcer  -      Row Name 01/03/24 1313          Coping    Observed Emotional State calm;cooperative;pleasant  -     Verbalized Emotional State acceptance  -     Trust Relationship/Rapport care explained;questions answered  -       Row Name 01/03/24 1313          Plan of Care Review    Plan of Care Reviewed With patient  -     Progress no change  -     Outcome Evaluation PT wound care initial evaluation complete. Pt presenting with moderate BLE edema and mild/moderate redness consistent with chronic venous stasis dermatitis. Pt with area of yellow crusting to the R lateral leg with likely open wound beneath. PT able to debride moderate amounts of crusting and applied BLE MLW to help promote venous return, improve skin integrity, and improve BLE function. PT plans to f/u with dressings and MLW changes in 2-3 days.  -       Row Name 01/03/24 1313          Positioning and Restraints    Pre-Treatment Position sitting in chair/recliner  -     Post Treatment Position chair  -     In Chair reclined;call light within reach;encouraged to call for assist;legs elevated  -       Row Name 01/03/24 1313          Therapy Assessment/Plan (PT)    Patient/Family Therapy Goals Statement (PT) Wound healing, reduce BLE edema  -     PT Diagnosis (PT) Moderate BLE edema, RLE venous ulceration  -       Row Name 01/03/24 1313          Therapy Plan Review/Discharge Plan (PT)    Therapy Plan Review (PT) evaluation/treatment results reviewed;care plan/treatment goals reviewed;risks/benefits reviewed;current/potential barriers reviewed;participants voiced agreement with care plan;participants included;patient  -        Row Name 01/03/24 1313          Physical Therapy Goals    Wound Care Goal Selection (PT) wound care, PT goal 1;wound care, PT goal 2  -       Row Name 01/03/24 1313          Wound Care Goal 1 (PT)    Wound Care Goal 1 (PT) Demonstrate only minimal remaining BLE edema to improve skin integrity and improve wound healing potential.  -     Time Frame (Wound Care Goal 1, PT) long term goal (LTG);10 days  -       Row Name 01/03/24 1313          Wound Care Goal 2 (PT)    Wound Care Goal 2 (PT) Decrease area of RLE wound by 50% as evidence of wound healing.  -     Time Frame (Wound Care Goal 2, PT) long term goal (LTG);10 days  -               User Key  (r) = Recorded By, (t) = Taken By, (c) = Cosigned By      Initials Name Provider Type    Tessy Canas, RN Registered Nurse     Kiko Beal, PT Physical Therapist                  Physical Therapy Education       Title: PT OT SLP Therapies (In Progress)       Topic: Physical Therapy (In Progress)       Point: Mobility training (Done)       Learning Progress Summary             Patient Acceptance, TB,E, NR,VU by  at 1/1/2024 0955                         Point: Home exercise program (Not Started)       Learner Progress:  Not documented in this visit.              Point: Body mechanics (Done)       Learning Progress Summary             Patient Acceptance, TB,E, NR,VU by  at 1/1/2024 0955                         Point: Precautions (Done)       Learning Progress Summary             Patient Acceptance, TB,E, NR,VU by  at 1/1/2024 0955                                         User Key       Initials Effective Dates Name Provider Type Discipline     09/22/22 -  Amparo Arreguin, PT Physical Therapist PT                    Recommendation and Plan  Anticipated Discharge Disposition (PT): inpatient rehabilitation facility  Planned Therapy Interventions (PT): wound care, patient/family education  Therapy Frequency (PT): daily  Plan of Care Reviewed With:  patient   Progress: no change       Progress: no change  Outcome Evaluation: PT wound care initial evaluation complete. Pt presenting with moderate BLE edema and mild/moderate redness consistent with chronic venous stasis dermatitis. Pt with area of yellow crusting to the R lateral leg with likely open wound beneath. PT able to debride moderate amounts of crusting and applied BLE MLW to help promote venous return, improve skin integrity, and improve BLE function. PT plans to f/u with dressings and MLW changes in 2-3 days.  Plan of Care Reviewed With: patient            Time Calculation   PT Charges       Row Name 01/03/24 1422             Time Calculation    Start Time 1313  -LH      PT Goal Re-Cert Due Date 01/11/24  -         Untimed Charges    PT Eval/Re-eval Minutes 32  -LH      Wound Care 38991 Selective debridement;04876 Multilayer comp below knee  -      18807-Yytkcjcleg comp below knee 20  -LH      35094-Fazhrvzfm debridement 15  -LH         Total Minutes    Untimed Charges Total Minutes 67  -LH       Total Minutes 67  -LH                User Key  (r) = Recorded By, (t) = Taken By, (c) = Cosigned By      Initials Name Provider Type     Kiko Beal, PT Physical Therapist                      Therapy Charges for Today       Code Description Service Date Service Provider Modifiers Qty    57278322926 HC PT RE-EVAL ESTABLISHED PLAN 2 1/3/2024 Kiko Beal, PT GP 1    29127357097 HC SHIELA DEBRIDE OPEN WOUND UP TO 20CM 1/3/2024 Kiko Beal, PT GP 1    92335857498 HC PT MULTI LAYER COMP SYS BELOW KNEE 1/3/2024 Kiko Beal, PT GP 1              PT G-Codes  Outcome Measure Options: AM-PAC 6 Clicks Basic Mobility (PT)  AM-PAC 6 Clicks Score (PT): 17  AM-PAC 6 Clicks Score (OT): 16       Kiko Bael PT  1/3/2024

## 2024-01-03 NOTE — PLAN OF CARE
Goal Outcome Evaluation:  Plan of Care Reviewed With: patient        Progress: no change  Outcome Evaluation: PT wound care initial evaluation complete. Pt presenting with moderate BLE edema and mild/moderate redness consistent with chronic venous stasis dermatitis. Pt with area of yellow crusting to the R lateral leg with likely open wound beneath. PT able to debride moderate amounts of crusting and applied BLE MLW to help promote venous return, improve skin integrity, and improve BLE function. PT plans to f/u with dressings and MLW changes in 2-3 days.

## 2024-01-04 ENCOUNTER — READMISSION MANAGEMENT (OUTPATIENT)
Dept: CALL CENTER | Facility: HOSPITAL | Age: 69
End: 2024-01-04
Payer: MEDICARE

## 2024-01-04 VITALS
HEART RATE: 58 BPM | HEIGHT: 71 IN | BODY MASS INDEX: 44.1 KG/M2 | SYSTOLIC BLOOD PRESSURE: 134 MMHG | TEMPERATURE: 97.9 F | OXYGEN SATURATION: 97 % | DIASTOLIC BLOOD PRESSURE: 75 MMHG | RESPIRATION RATE: 18 BRPM | WEIGHT: 315 LBS

## 2024-01-04 LAB
ANION GAP SERPL CALCULATED.3IONS-SCNC: 6 MMOL/L (ref 5–15)
BACTERIA SPEC AEROBE CULT: NORMAL
BACTERIA SPEC AEROBE CULT: NORMAL
BUN SERPL-MCNC: 19 MG/DL (ref 8–23)
BUN/CREAT SERPL: 20.7 (ref 7–25)
CALCIUM SPEC-SCNC: 8.4 MG/DL (ref 8.6–10.5)
CHLORIDE SERPL-SCNC: 94 MMOL/L (ref 98–107)
CO2 SERPL-SCNC: 35 MMOL/L (ref 22–29)
CREAT SERPL-MCNC: 0.92 MG/DL (ref 0.76–1.27)
EGFRCR SERPLBLD CKD-EPI 2021: 90.6 ML/MIN/1.73
GLUCOSE SERPL-MCNC: 89 MG/DL (ref 65–99)
MAGNESIUM SERPL-MCNC: 1.7 MG/DL (ref 1.6–2.4)
PHOSPHATE SERPL-MCNC: 2.9 MG/DL (ref 2.5–4.5)
POTASSIUM SERPL-SCNC: 4.1 MMOL/L (ref 3.5–5.2)
SODIUM SERPL-SCNC: 135 MMOL/L (ref 136–145)

## 2024-01-04 PROCEDURE — 25010000002 THIAMINE PER 100 MG: Performed by: NURSE PRACTITIONER

## 2024-01-04 PROCEDURE — 25010000002 FUROSEMIDE PER 20 MG: Performed by: INTERNAL MEDICINE

## 2024-01-04 PROCEDURE — 25010000002 HEPARIN (PORCINE) PER 1000 UNITS: Performed by: INTERNAL MEDICINE

## 2024-01-04 PROCEDURE — 25010000002 CEFAZOLIN PER 500 MG: Performed by: INTERNAL MEDICINE

## 2024-01-04 PROCEDURE — 83735 ASSAY OF MAGNESIUM: CPT | Performed by: INTERNAL MEDICINE

## 2024-01-04 PROCEDURE — 80048 BASIC METABOLIC PNL TOTAL CA: CPT | Performed by: INTERNAL MEDICINE

## 2024-01-04 PROCEDURE — 84100 ASSAY OF PHOSPHORUS: CPT | Performed by: INTERNAL MEDICINE

## 2024-01-04 RX ORDER — MELATONIN
5000 DAILY
OUTPATIENT
Start: 2024-01-05

## 2024-01-04 RX ORDER — MULTIPLE VITAMINS W/ MINERALS TAB 9MG-400MCG
1 TAB ORAL DAILY
Qty: 30 TABLET | Refills: 0 | OUTPATIENT
Start: 2024-01-05

## 2024-01-04 RX ORDER — MELATONIN
5000 DAILY
Qty: 30 TABLET | Refills: 0 | Status: SHIPPED | OUTPATIENT
Start: 2024-01-05

## 2024-01-04 RX ORDER — FOLIC ACID 1 MG/1
1 TABLET ORAL DAILY
OUTPATIENT
Start: 2024-01-05

## 2024-01-04 RX ORDER — LANOLIN ALCOHOL/MO/W.PET/CERES
100 CREAM (GRAM) TOPICAL DAILY
Qty: 30 TABLET | Refills: 0 | Status: SHIPPED | OUTPATIENT
Start: 2024-01-05

## 2024-01-04 RX ORDER — FOLIC ACID 1 MG/1
1 TABLET ORAL DAILY
Qty: 30 TABLET | Refills: 0 | Status: SHIPPED | OUTPATIENT
Start: 2024-01-05

## 2024-01-04 RX ORDER — POTASSIUM CHLORIDE 750 MG/1
10 TABLET, FILM COATED, EXTENDED RELEASE ORAL DAILY
Qty: 30 TABLET | Refills: 0 | Status: SHIPPED | OUTPATIENT
Start: 2024-01-04

## 2024-01-04 RX ORDER — FUROSEMIDE 40 MG/1
40 TABLET ORAL 2 TIMES DAILY
Qty: 30 TABLET | Refills: 0 | Status: SHIPPED | OUTPATIENT
Start: 2024-01-04

## 2024-01-04 RX ORDER — MULTIPLE VITAMINS W/ MINERALS TAB 9MG-400MCG
1 TAB ORAL DAILY
Qty: 30 TABLET | Refills: 0 | Status: SHIPPED | OUTPATIENT
Start: 2024-01-05

## 2024-01-04 RX ORDER — NICOTINE 21 MG/24HR
1 PATCH, TRANSDERMAL 24 HOURS TRANSDERMAL
Qty: 14 PATCH | Refills: 0 | Status: SHIPPED | OUTPATIENT
Start: 2024-01-05

## 2024-01-04 RX ADMIN — Medication 1 TABLET: at 08:22

## 2024-01-04 RX ADMIN — THIAMINE HYDROCHLORIDE 100 MG: 100 INJECTION, SOLUTION INTRAMUSCULAR; INTRAVENOUS at 08:22

## 2024-01-04 RX ADMIN — METOPROLOL SUCCINATE 50 MG: 50 TABLET, EXTENDED RELEASE ORAL at 08:22

## 2024-01-04 RX ADMIN — FOLIC ACID 1 MG: 1 TABLET ORAL at 08:23

## 2024-01-04 RX ADMIN — LISINOPRIL 20 MG: 20 TABLET ORAL at 08:23

## 2024-01-04 RX ADMIN — Medication 5000 UNITS: at 08:22

## 2024-01-04 RX ADMIN — HYDROCHLOROTHIAZIDE 25 MG: 25 TABLET ORAL at 08:22

## 2024-01-04 RX ADMIN — NICOTINE 1 PATCH: 14 PATCH, EXTENDED RELEASE TRANSDERMAL at 04:58

## 2024-01-04 RX ADMIN — FUROSEMIDE 20 MG: 10 INJECTION, SOLUTION INTRAMUSCULAR; INTRAVENOUS at 04:56

## 2024-01-04 RX ADMIN — ALLOPURINOL 300 MG: 300 TABLET ORAL at 08:23

## 2024-01-04 RX ADMIN — HEPARIN SODIUM 5000 UNITS: 5000 INJECTION INTRAVENOUS; SUBCUTANEOUS at 04:56

## 2024-01-04 RX ADMIN — SODIUM CHLORIDE 2000 MG: 900 INJECTION INTRAVENOUS at 04:57

## 2024-01-04 RX ADMIN — Medication 10 ML: at 08:24

## 2024-01-04 NOTE — CASE MANAGEMENT/SOCIAL WORK
Continued Stay Note  [facility]     Patient Name: Camron James  MRN: [id number]  Today's Date: 1/4/2024    Admit Date: 12/29/2023    Plan: update   Discharge Plan       Row Name 01/04/24 0944       Plan    Plan update    Patient/Family in Agreement with Plan yes    Plan Comments Patient has orders for discharge.  PT has recommended rehab.  Spoke with patient at bedside regarding PT recommendations.  Patient indicates that he is not really interested in going to another setting for rehab and has a friend who spent a year in the hospital and then went to [facility] only to get an infection.  Patient wants to speak with his PCP before deciding on any rehab.  Discussed PT recommendations and notes, patient reports the only thing PT did was talk to him they really did not do anything so he does not feel they really know his case.  Discussed HH services with patient who has declined and reports he will speak to his PCP about it.  CM advised that his PCP can write orders for HH if he feels he needs that service.  Discussed patient O2 sats and need for home O2.  Per RN, patient was 84% on RA this a.m.  Patient reports he does not want to become dependent on Oxygen and does not think he wants it.  CM discussed the lack of Oxygen to the tissues can effect healing.  Patient requests CM come back in 30 minutes to give him time to think about it and he will let CM know what he decides.  CM following.  Patient discharge plan is ongoing.    Final Discharge Disposition Code 01 - home or self-care                   Discharge Codes    No documentation.                 Expected Discharge Date and Time       Expected Discharge Date Expected Discharge Time    Jan 4, 2024               Kamla Fishman, [place]

## 2024-01-04 NOTE — CASE MANAGEMENT/SOCIAL WORK
Case Management Discharge Note      Final Note: Spoke with patient at bedside regarding his decision about home Oxygen, patient declines and reports he has been watching it and does not feel he needs Oxygen at this time. No discharge needs verbalized. Patient plan is to discharge home today via car with his spouse to transport.         Selected Continued Care - Admitted Since 12/29/2023       Destination    No services have been selected for the patient.                Durable Medical Equipment    No services have been selected for the patient.                Dialysis/Infusion    No services have been selected for the patient.                Home Medical Care    No services have been selected for the patient.                Therapy    No services have been selected for the patient.                Community Resources    No services have been selected for the patient.                Community & DME    No services have been selected for the patient.                         Final Discharge Disposition Code: 01 - home or self-care

## 2024-01-05 NOTE — OUTREACH NOTE
Prep Survey      Flowsheet Row Responses   [facility] facility patient discharged from? [facility]   Is LACE score < 7 ? No   Eligibility Readm Mgmt   Discharge diagnosis Acute on chronic congestive heart failure   Does the patient have one of the following disease processes/diagnoses(primary or secondary)? CHF   Does the patient have Home health ordered? No   Is there a DME ordered? No   Prep survey completed? Yes            Monika SMITH - Registered Nurse

## 2024-01-09 ENCOUNTER — READMISSION MANAGEMENT (OUTPATIENT)
Dept: CALL CENTER | Facility: HOSPITAL | Age: 69
End: 2024-01-09
Payer: MEDICARE

## 2024-01-09 NOTE — DISCHARGE SUMMARY
[facility]  DISCHARGE SUMMARY    Patient Name: Camron James  : 1955  MRN: [id number]    Date of Admission: 2023  7:11 PM  Date of Discharge:  24  Primary Care Physician: Senthil Lazar MD    Consults       Date and Time Order Name Status Description    2023 12:32 AM Inpatient Gastroenterology Consult Completed             Hospital Course     Presenting Problem: Edema of lower extremities and scrotum.  Also hypoxia.    Active Hospital Problems    Diagnosis  POA    **Acute respiratory failure with hypoxia [J96.01]  Unknown    Ascites [R18.8]  Yes    Liver disease [K76.9]  Yes    Cellulitis [L03.90]  Yes    Hypertensive urgency [I16.0]  Yes    Fluid overload, unspecified [E87.70]  Yes      Resolved Hospital Problems   No resolved problems to display.          Hospital Course:  Camron James is a 68 y.o. male . male who presented with acute respiratory failure with hypoxia secondary to volume overload.  Imaging reveals pulmonary edema on chest x-ray.  CT abdomen pelvis revealed chronic liver disease with ascites and anasarca.  There is also concern for bilateral lower extremity edema and cellulitis.     Acute respiratory failure with hypoxia  Volume overload  Anasarca  Pulmonary edema  -Received IV Lasix 80 mg in the ED  -Continue Lasix   -Echo with EF 56-60.  This study also shows normal diastolic function.  However, it also shows moderate tricuspid valve regurgitation with markedly elevated right ventricular systolic pressure from tricuspid regurgitation which is higher than 55 mmHg.  This study also shows evidence of left atrial cavity mild dilatation and also right ventricle ventricular cavity borderline dilatation.  -Initially patient required oxygen but then after diuresis and at the time of discharge patient did not require any oxygen.  -- Overall symptoms have significantly improved.  Currently patient seems to be at his  baseline.        Bilateral lower extremity edema/cellulitis  - Received antibiotics     Hypertensive urgency  Hypertensive encephalopathy, resolved  -Blood pressure improved after Lasix  --Currently on home medication for blood pressure and blood pressure is reasonably controlled.  - Blood pressure much better controlled at the time of discharge.     Chronic liver disease with ascites and splenomegaly  -GI has seen and evaluated patient.   -- CT scan was concerning for chronic liver disease.  There was splenomegaly and abnormal liver texture on the CT scan.  Ultrasound of the liver also is concerning for chronic liver disease.  -- GI ordered some labs including testosterone which is low, vitamin D which is low.  GI has had the long conversation with the patient about the condition.  Patient does have drinking habits and GI has addressed this issue.     Vitamin D deficiency  -25-hydroxy vitamin D is less than 6.0.  -Will start the patient on vitamin D treatment     Hypokalemia  -Replace per protocol     Deconditioning  Physical debility  -PT and OT recommended inpatient rehab    Note: I have had a long conversation with the patient about effective alcohol abuse.  Also have emphasized compliance with medication and also follow-up with his primary care physician.      Discharge Follow Up Recommendations for outpatient labs/diagnostics:       Day of Discharge     HPI:   Resting in bed in no acute distress and does not have any specific complaint.  Patient is very very eager to go home.  Denies any fever or chills.  No chest pain or palpitation or shortness of breath at rest.  No nausea vomiting or diarrhea or abdominal pain.    Review of Systems  As above    Vital Signs:          Physical Exam:  Constitutional: No acute distress, awake, alert  HENT: NCAT, mucous membranes moist  Respiratory: Clear to auscultation bilaterally, decreased breath sound bilaterally, breathing without labor.  Cardiovascular: RRR, no murmur  gallop or rub audible.  Gastrointestinal: Very obese.  Positive bowel sounds, soft, mildly distended  Musculoskeletal: Bilateral lower extremity edema with erythema and skin thickening bilaterally  Psychiatric: Appropriate affect, cooperative  Neurologic: Oriented x 3, generally weak, Cranial Nerves grossly intact to confrontation, speech clear  Skin: No rash    Pertinent  and/or Most Recent Results     LAB RESULTS:      Lab 01/03/24  0436   WBC 10.29   HEMOGLOBIN 11.8*   HEMATOCRIT 37.3*   PLATELETS 254   MCV 90.1         Lab 01/04/24  0519   SODIUM 135*   POTASSIUM 4.1   CHLORIDE 94*   CO2 35.0*   ANION GAP 6.0   BUN 19   CREATININE 0.92   EGFR 90.6   GLUCOSE 89   CALCIUM 8.4*   MAGNESIUM 1.7   PHOSPHORUS 2.9                         Brief Urine Lab Results  (Last result in the past 365 days)        Color   Clarity   Blood   Leuk Est   Nitrite   Protein   CREAT   Urine HCG        12/29/23 2231 Yellow   Clear   Trace   Negative   Negative   30 mg/dL (1+)                 Microbiology Results (last 10 days)       ** No results found for the last 240 hours. **            US Liver    Result Date: 1/1/2024  EXAMINATION: US LIVER DATE OF EXAM:1/1/2024 9:37 AM EST INDICATION: New Dx Cirrhosis; HCC surveillance. COMPARISON: CT abdomen and pelvis without contrast 12/29/2023 TECHNIQUE: Sonographic grayscale, color Doppler, and spectral Doppler images of the liver were obtained with representative examples submitted to PACS for interpretation. 2D shear wave elastography (2D-SWE) of the liver was performed using Siemens Virtual Touch software/hardware. Reference Guidelines from: Chinmay MONTANA, Carson RENTERIA, José Luis D, et al. Update to the Society of Radiologists in Ultrasound Liver Elastography Consensus Statement. Radiology. Volume 296:Number2-August 2020; p.263-274 Chinmay MONTANA, Brad G, Ulysses JENSEN, et al. Elastography Assessment of Liver Fibrosis: Society of Radiologists in Ultrasound Consensus Conference Statement. Radiology. Volume  276:Number 3-September 2015; p. [phone]. FINDINGS: US LIVER: Background liver parenchyma is mildly coarsened which may relate to chronic liver disease. No discrete liver lesion. No perihepatic ascites. There is hepatopetal flow in the portal vein. Visualized hepatic veins are patent. Gallbladder present. Negative for cholelithiasis. No pericholecystic fluid. Common bile duct measures 4 mm. The right kidney measures 10.6 x 5.3 cm. No right-sided hydronephrosis. US ELASTOGRAPHY: 10 shear wave speed measurements were obtained using a right intercostal approach. The IQR/Median was 0.24 which is >0.15 indicating a poor quality dataset. The variance in the measurement is large and therefore the accuracy of the measurement may be unreliable. The median liver shear wave speed is 1.99 m/s. This is suggestive of compensated advanced chronic liver disease but further testing is needed for confirmation.. NOTE: In some patients with NAFLD, the cutoff values for compensated advanced chronic liver disease may be lower and follow up or additional testing would be recommended with those between 1.5 and 1.7 m/s. Several conditions can increase liver stiffness unrelated to liver fibrosis including acute hepatitis, liver inflammation, ALT levels >5x normal, obstructive cholestasis, hepatic congestion, and infiltrative liver diseases such as amyloidosis, lymphoma, or extramedullary hematopoiesis. In all these conditions, however, stiffness values within the normal range exclude significant fibrosis.     1. Median liver shear wave speed is 1.99 m/s. This is suggestive of compensated advanced chronic liver disease but further testing is needed for confirmation. 2. Coarsened hepatic parenchyma suggesting chronic liver disease. Electronically Signed: Galen Varela MD  1/1/2024 6:23 PM EST  Workstation ID: [id number]    Adult Transthoracic Echo Complete w/ Color, Spectral and Contrast if necessary per protocol    Result Date: 12/30/2023    Left  ventricular systolic function is normal. Left ventricular ejection fraction appears to be 56 - 60%.   Left ventricular wall thickness is consistent with mild concentric hypertrophy.   Left ventricular diastolic function was normal.   The right ventricular cavity is borderline dilated.   The left atrial cavity is mildly dilated.   The right atrial cavity is borderline dilated.   Moderate tricuspid valve regurgitation is present.   Estimated right ventricular systolic pressure from tricuspid regurgitation is markedly elevated (>55 mmHg).     XR Chest 1 View    Result Date: 12/29/2023  XR CHEST 1 VW Date of Exam: 12/29/2023 8:12 PM EST Indication: pedal edema, SOA Comparison: None available. Findings: Heart shadow is moderately enlarged. The vasculature is cephalized and there is a diffuse pulmonary edema pattern present. There may be minimal effusions. No pneumothorax or lung consolidation is seen. Advanced right shoulder joint DJD is seen with high riding humerus, suggesting chronic rotator cuff tear.     Impression: Congestive heart failure with moderate pulmonary interstitial edema. Electronically Signed: Ronn Liz MD  12/29/2023 8:29 PM EST  Workstation ID: [id number]    CT Abdomen Pelvis Without Contrast    Result Date: 12/29/2023  CT ABDOMEN PELVIS WO CONTRAST Date of Exam: 12/29/2023 7:44 PM EST Indication: Abdominal pain, acute, nonlocalized. Comparison: None available. Technique: Axial CT images were obtained of the abdomen and pelvis without the administration of contrast. Reconstructed coronal and sagittal images were also obtained. Automated exposure control and iterative construction methods were used. Findings: Lower Thorax: Heart appears enlarged. No focal consolidation. Partially-visualized left axillary and lower paraesophageal lymphadenopathy. Liver: Widening of the fissures with caudate lobe hypertrophy, which are morphologic changes seen with chronic liver disease. No evidence of focal liver lesion  on this noncontrast exam Gallbladder and bile ducts: Gallbladder is unremarkable. No biliary ductal dilatation. Pancreas: No pancreatic duct dilation. No surrounding inflammation. Spleen: Enlarged measuring 15 cm in greatest dimension. Adrenal glands: No discrete adrenal nodule. Kidneys: No hydronephrosis. No nephroureterolithiasis. Small bilateral likely cysts, as imaged without contrast. Urinary bladder: Unremarkable. Reproductive Organs: Extensive scrotal edema. Stomach and Bowel: No evidence of bowel obstruction. Lymph nodes: Multiple enlarged lymph nodes throughout the abdomen and pelvis. For example an aortocaval lymph node measures 2.8 x 2.3 cm (series 2 image 85) and a right inguinal lymph node measures 3.8 x 2.3 cm (series 2 image 174). Vessels: No abdominal aortic aneurysm. Suspected upper abdominal portosystemic collaterals. Peritoneum and retroperitoneum: Small volume free fluid, some which may be loculated, for example along the left paracolic gutter and in the left pelvis. Soft tissues: Diffuse subcutaneous edema. Osseous structures: No acute or suspicious osseous lesions. Multilevel degenerative changes of the spine. Advanced arthritis of the right hip.     Impression: Morphologic changes suggestive of chronic liver disease, with sequela of portal hypertension including upper abdominal collaterals and splenomegaly. Findings of anasarca with small volume ascites, diffuse subcutaneous edema, and significant scrotal edema. Some of the abdominopelvic fluid may be loculated, for example in the left pelvis and along the left paracolic gutter. Lymphadenopathy throughout the abdomen and pelvis, as well as the lower chest. Findings are nonspecific and can be seen with infectious, inflammatory, and neoplastic etiologies. A follow-up exam is recommended to evaluate for interval change, perhaps in 3 months, with further recommendations at that time, which may include PET/CT or tissue sampling, if these are not  known findings. Above findings as imaged without contrast. Electronically Signed: Benjamin Addison MD  12/29/2023 8:17 PM EST  Workstation ID: [id number]    CT Head Without Contrast    Result Date: 12/29/2023  CT HEAD WO CONTRAST Date of Exam: 12/29/2023 7:37 PM EST Indication: hallucinations, confusion. Comparison: None available. Technique: Axial CT images were obtained of the head without contrast administration.  Automated exposure control and iterative construction methods were used. Findings: No evidence of acute intracranial hemorrhage or mass effect. No extra-axial collection. The gray white matter differentiation is preserved. Ventricles and sulci are symmetric. The mastoid air cells and paranasal sinuses are well aerated. Globes and extraocular muscles are unremarkable. No acute or suspicious osseous abnormality. Soft tissues within normal limits.     Impression: No evidence of acute intracranial abnormality. Electronically Signed: Benjamin Addison MD  12/29/2023 7:57 PM EST  Workstation ID: [id number]             Results for orders placed during the hospital encounter of 12/29/23    Adult Transthoracic Echo Complete w/ Color, Spectral and Contrast if necessary per protocol    Interpretation Summary    Left ventricular systolic function is normal. Left ventricular ejection fraction appears to be 56 - 60%.    Left ventricular wall thickness is consistent with mild concentric hypertrophy.    Left ventricular diastolic function was normal.    The right ventricular cavity is borderline dilated.    The left atrial cavity is mildly dilated.    The right atrial cavity is borderline dilated.    Moderate tricuspid valve regurgitation is present.    Estimated right ventricular systolic pressure from tricuspid regurgitation is markedly elevated (>55 mmHg).      Plan for Follow-up of Pending Labs/Results:     Discharge Details        Discharge Medications        New Medications        Instructions Start Date   cholecalciferol  25 MCG (1000 UT) tablet  Commonly known as: VITAMIN D3   5,000 Units, Oral, Daily      folic acid 1 MG tablet  Commonly known as: FOLVITE   1 mg, Oral, Daily      furosemide 40 MG tablet  Commonly known as: Lasix   40 mg, Oral, 2 Times Daily      multivitamin with minerals tablet tablet   1 tablet, Oral, Daily      nicotine 14 MG/24HR patch  Commonly known as: NICODERM CQ   1 patch, Transdermal, Every 24 Hours Scheduled      potassium chloride 10 MEQ CR tablet   10 mEq, Oral, Daily      thiamine 100 MG tablet  Commonly known as: VITAMIN B1   100 mg, Oral, Daily             Continue These Medications        Instructions Start Date   allopurinol 300 MG tablet  Commonly known as: ZYLOPRIM   300 mg, Oral, Daily      doxazosin 4 MG tablet  Commonly known as: CARDURA   4 mg, Oral, Daily      hydroCHLOROthiazide 25 MG tablet  Commonly known as: HYDRODIURIL   25 mg, Oral, Daily      lisinopril 20 MG tablet  Commonly known as: PRINIVIL,ZESTRIL   20 mg, Oral, Daily      metoprolol succinate XL 50 MG 24 hr tablet  Commonly known as: TOPROL-XL   50 mg, Oral, Daily               No Known Allergies      Discharge Disposition:  Home or Self Care    Diet:  Hospital:No active diet order      Diet Instructions       Diet: Regular/House Diet, Cardiac Diets; Healthy Heart (2-3 Na+); Thin (IDDSI 0)      Discharge Diet:  Regular/House Diet  Cardiac Diets       Cardiac Diet: Healthy Heart (2-3 Na+)    Fluid Consistency: Thin (IDDSI 0)             Activity:  Activity Instructions       Activity as Tolerated              Restrictions or Other Recommendations:         CODE STATUS:    Code Status and Medical Interventions:   Ordered at: 12/29/23 6046     Level Of Support Discussed With:    Patient     Code Status (Patient has no pulse and is not breathing):    CPR (Attempt to Resuscitate)     Medical Interventions (Patient has pulse or is breathing):    Full Support       Future Appointments   Date Time Provider Department Center   2/22/2024   9:30 AM Felisha Skinner APRN [facility]       Additional Instructions for the Follow-ups that You Need to Schedule       Discharge Follow-up with PCP   As directed       Currently Documented PCP:    Senthil Lazar MD    PCP Phone Number:    [phone]     Follow Up Details: within one week        Discharge Follow-up with Specified Provider: with GI as per schedule   As directed      To: with GI as per schedule                      Aamir Smith MD  01/09/24      Time Spent on Discharge:  I spent  40  minutes on this discharge activity which included: face-to-face encounter with the patient, reviewing the data in the system, coordination of the care with the nursing staff as well as consultants, documentation, and entering orders.

## 2024-01-09 NOTE — OUTREACH NOTE
CHF Week 1 Survey      Flowsheet Row Responses   [facility] facility patient discharged from? [place]   Does the patient have one of the following disease processes/diagnoses(primary or secondary)? CHF   CHF Week 1 attempt successful? No   Unsuccessful attempts Attempt 1            SAMM JENSEN - Registered Nurse

## 2024-01-18 ENCOUNTER — READMISSION MANAGEMENT (OUTPATIENT)
Dept: CALL CENTER | Facility: HOSPITAL | Age: 69
End: 2024-01-18
Payer: MEDICARE

## 2024-01-18 NOTE — OUTREACH NOTE
CHF Week 2 Survey      Flowsheet Row Responses   [facility] facility patient discharged from? [place]   Does the patient have one of the following disease processes/diagnoses(primary or secondary)? CHF   Week 2 attempt successful? No   Unsuccessful attempts Attempt 1            Dawn CURRY - Registered Nurse

## 2024-01-23 ENCOUNTER — READMISSION MANAGEMENT (OUTPATIENT)
Dept: CALL CENTER | Facility: HOSPITAL | Age: 69
End: 2024-01-23
Payer: MEDICARE

## 2024-01-23 NOTE — OUTREACH NOTE
CHF Week 3 Survey      Flowsheet Row Responses   [facility] [place] [facility]   Does the patient have one of the following disease processes/diagnoses(primary or secondary)? CHF   Week 3 attempt successful? Yes   Call start time 1550   Call end time 1604   Discharge diagnosis Acute respiratory failure with hypoxia    Ascites    Liver disease   Cellulitis    Hypertensive urgency     Fluid overload, unspecified   Person spoke with today (if not patient) and relationship Patient   Meds reviewed with patient/caregiver? Yes   Does the patient have all medications ordered at discharge? Yes   Is the patient taking all medications as directed (includes completed medication regime)? No   What is preventing the patient from taking all medications as directed? Other  [Patient was not taking full amount of his Vitamin D because he was going to run out before seeing his Dr.]   Nursing Interventions Nurse provided patient education, Advised patient to call provider  [Advised to take med as prescribed and contact his physician for refill.]   Does the patient have a primary care provider?  Yes   Does the patient have an appointment with their PCP within 7 days of discharge? Greater than 7 days   Comments regarding PCP Follow up with Senthil TOWNSEND. Appt 1/11 but patient reports that he had to cancel and reschedule   Nursing Interventions Verified appointment date/time/provider   Has the patient kept scheduled appointments due by today? No   Nursing Interventions Educated on importance of keeping appointment   Comments Gastroenterology appt in place for 2/22/24  930am with Felisha MONTENEGRO   Has home health visited the patient within 72 hours of discharge? N/A   Pulse Ox monitoring None   Psychosocial issues? No   Did the patient receive a copy of their discharge instructions? Yes   Nursing interventions Reviewed instructions with patient   What is the patient's perception of their health status since  discharge? Improving  [Reports scrotal edema has gone down, does not feel that he is holding extra fluid now.]   Is the patient able to teach back signs and symptoms of worsening condition? (i.e. weight gain, shortness of air, etc.) Yes   If the patient is a current smoker, are they able to teach back resources for cessation? Not a smoker   Is the patient/caregiver able to teach back the hierarchy of who to call/visit for symptoms/problems? PCP, Specialist, Home health nurse, Urgent Care, ED, 911 Yes   CHF Week 3 call completed? Yes   Graduated Yes   Is the patient interested in additional calls from an ambulatory ? No   Would this patient benefit from a Referral to [facility] Social Work? No   Call end time 1604            HERMELINDA LEYVA - Registered Nurse

## 2025-01-23 NOTE — THERAPY EVALUATION
Patient Name: Camron James  : 1955    MRN: [id number]                              Today's Date: 2024       Admit Date: 2023    Visit Dx:     ICD-10-CM ICD-9-CM   1. Acute on chronic congestive heart failure, unspecified heart failure type  I50.9 428.0   2. Exertional dyspnea  R06.09 786.09   3. Hypoxia  R09.02 799.02   4. Pedal edema  R60.0 782.3   5. Scrotal edema  N50.89 608.86   6. Elevated blood pressure reading with diagnosis of hypertension  I10 401.9   7. Hypomagnesemia  E83.42 275.2   8. Hypokalemia  E87.6 276.8   9. History of obesity  Z86.39 V12.29   10. H/O noncompliance with medical treatment, presenting hazards to health  Z91.199 V15.81     Patient Active Problem List   Diagnosis    Fluid overload, unspecified    Acute respiratory failure with hypoxia    Ascites    Liver disease    Cellulitis    Hypertensive urgency     Past Medical History:   Diagnosis Date    CHF (congestive heart failure)     Elevated cholesterol     Hypertension      History reviewed. No pertinent surgical history.   General Information       Row Name 24 0940          Physical Therapy Time and Intention    Document Type evaluation  -     Mode of Treatment physical therapy  -       Row Name 24 0940          General Information    Patient Profile Reviewed yes  -     Prior Level of Function independent:;all household mobility;community mobility;gait;transfer;bed mobility  ambulates with cane at baseline, recent decline d/t increase swelling, recent fall  -     Existing Precautions/Restrictions fall;other (see comments);oxygen therapy device and L/min  BLE edema and scrotal edema  -     Barriers to Rehab medically complex;previous functional deficit  -       Row Name 24 0940          Living Environment    People in Home alone;other (see comments)  spouse lives 5 minutes away, may d/c to spouse's house.  -       Row Name 24 0940          Home Main Entrance    Number of  Stairs, Main Entrance five;other (see comments)  5 at pt's house  -     Stair Railings, Main Entrance railings on both sides of stairs  -       Row Name 01/01/24 0940          Stairs Within Home, Primary    Stairs, Within Home, Primary flight of stairs to basement  -     Stair Railings, Within Home, Primary railings on both sides of stairs  -       Row Name 01/01/24 0940          Safety Issues, Functional Mobility    Safety Issues Affecting Function (Mobility) insight into deficits/self-awareness;safety precaution awareness;safety precautions follow-through/compliance;sequencing abilities;awareness of need for assistance  -     Impairments Affecting Function (Mobility) balance;endurance/activity tolerance;pain;strength;range of motion (ROM)  -               User Key  (r) = Recorded By, (t) = Taken By, (c) = Cosigned By      Initials Name Provider Type     Amparo Arreguin PT Physical Therapist                   Mobility       Row Name 01/01/24 0914          Bed Mobility    Bed Mobility supine-sit  -     Supine-Sit [place] (Bed Mobility) moderate assist (50% patient effort);2 person assist  -     Assistive Device (Bed Mobility) head of bed elevated  -     Comment, (Bed Mobility) assist to upright trunk, increased time d/t swelling  -       Row Name 01/01/24 0957          Bed-Chair Transfer    Bed-Chair [place] (Transfers) moderate assist (50% patient effort);2 person assist  -     Assistive Device (Bed-Chair Transfers) other (see comments)  BUE support  -     Comment, (Bed-Chair Transfer) BUE support, pt able to take short steps towards chair  -       Row Name 01/01/24 0963          Sit-Stand Transfer    Sit-Stand [place] (Transfers) maximum assist (25% patient effort);2 person assist  -     Assistive Device (Sit-Stand Transfers) other (see comments)  BUE support  -     Comment, (Sit-Stand Transfer) BUE support with raised bed and significant boost to achive upright  posture, attempted initially with cane however pt required increased support  -       Row Name 01/01/24 0943          Gait/Stairs (Locomotion)    Comment, (Gait/Stairs) pt declined at this time  -               User Key  (r) = Recorded By, (t) = Taken By, (c) = Cosigned By      Initials Name Provider Type     Amparo Arreguin PT Physical Therapist                   Obj/Interventions       Row Name 01/01/24 0949          Range of Motion Comprehensive    Comment, General Range of Motion limited BLE AROM d/t swelling  -       Row Name 01/01/24 0949          Strength Comprehensive (MMT)    Comment, General Manual Muscle Testing (MMT) Assessment BLE grossly 3+/5  -       Row Name 01/01/24 0949          Motor Skills    Motor Skills functional endurance  -     Functional Endurance quickness to fatigue  -       Row Name 01/01/24 0949          Balance    Balance Assessment sitting static balance;sitting dynamic balance;sit to stand dynamic balance;standing static balance;standing dynamic balance  -     Static Sitting Balance standby assist  -     Dynamic Sitting Balance standby assist  -     Position, Sitting Balance unsupported  -     Sit to Stand Dynamic Balance maximum assist;2-person assist  -     Static Standing Balance moderate assist;2-person assist  -     Dynamic Standing Balance 2-person assist;moderate assist  -     Position/Device Used, Standing Balance supported  -     Balance Interventions standing;dynamic;occupation based/functional task  -       Row Name 01/01/24 0949          Sensory Assessment (Somatosensory)    Sensory Assessment (Somatosensory) other (see comments)  B feet decreased sensation  -               User Key  (r) = Recorded By, (t) = Taken By, (c) = Cosigned By      Initials Name Provider Type     Amparo Arreguin PT Physical Therapist                   Goals/Plan       Row Name 01/01/24 0954          Bed Mobility Goal 1 (PT)    Activity/Assistive Device (Bed  Mobility Goal 1, PT) bed mobility activities, all  -HM     [place] Level/Cues Needed (Bed Mobility Goal 1, PT) independent  -HM     Time Frame (Bed Mobility Goal 1, PT) long term goal (LTG);10 days  -HM     Progress/Outcomes (Bed Mobility Goal 1, PT) new goal  -       Row Name 01/01/24 0954          Transfer Goal 1 (PT)    Activity/Assistive Device (Transfer Goal 1, PT) sit-to-stand/stand-to-sit;bed-to-chair/chair-to-bed  -HM     [place] Level/Cues Needed (Transfer Goal 1, PT) contact guard required  -HM     Time Frame (Transfer Goal 1, PT) long term goal (LTG);10 days  -HM     Progress/Outcome (Transfer Goal 1, PT) new goal  -       Row Name 01/01/24 0954          Gait Training Goal 1 (PT)    Activity/Assistive Device (Gait Training Goal 1, PT) gait (walking locomotion)  -HM     [place] Level (Gait Training Goal 1, PT) contact guard required  -HM     Distance (Gait Training Goal 1, PT) 150  -HM     Time Frame (Gait Training Goal 1, PT) long term goal (LTG);10 days  -HM     Progress/Outcome (Gait Training Goal 1, PT) new goal  -       Row Name 01/01/24 0954          Stairs Goal 1 (PT)    Activity/Assistive Device (Stairs Goal 1, PT) ascending stairs;descending stairs  -HM     [place] Level/Cues Needed (Stairs Goal 1, PT) contact guard required  -HM     Number of Stairs (Stairs Goal 1, PT) 13  -HM     Time Frame (Stairs Goal 1, PT) long term goal (LTG);10 days  -HM     Progress/Outcome (Stairs Goal 1, PT) new [facility]  -       Row Name 01/01/24 0954          Therapy Assessment/Plan (PT)    Planned Therapy Interventions (PT) balance training;bed mobility training;gait training;home exercise program;neuromuscular re-education;postural re-education;patient/family education;ROM (range of motion);stair training;strengthening;transfer training  -               User Key  (r) = Recorded By, (t) = Taken By, (c) = Cosigned By      Initials Name Provider Type    Amparo Magana, PT Physical  Therapist                   Clinical Impression       Row Name 01/01/24 0950          Pain    Pretreatment Pain Rating 1/10  -     Posttreatment Pain Rating 1/10  -     Pain Location - Side/Orientation Bilateral  -     Pain Location generalized  -     Pain Location - extremity;other (see comments)  scrotal  -     Pre/Posttreatment Pain Comment tolerated  -     Pain Intervention(s) Repositioned;Ambulation/increased activity  -       Row Name 01/01/24 0950          Plan of Care Review    Plan of Care Reviewed With patient  -     Progress no change  -     Outcome Evaluation PT eval completed. Pt completed a bed to chair transfer with Mod A x2 with BUE support and STS with Max A x2 with BUE support. Mobility limited d/t generalized weakness and edema. Pt demonstrated mobility below baseline function with strength deficits, decreased AROM in BLE, balance deficits, and decreased functional endurance. d/c rec IRF  -       Row Name 01/01/24 0950          Therapy Assessment/Plan (PT)    Rehab Potential (PT) good, to achieve stated therapy goals  -     Criteria for Skilled Interventions Met (PT) yes;meets criteria;skilled treatment is necessary  -     Therapy Frequency (PT) daily  -       Row Name 01/01/24 0950          Vital Signs    Pre Systolic BP Rehab 155  -HM     Pre Treatment Diastolic BP 72  -HM     Pretreatment Heart Rate (beats/min) 69  -HM     Posttreatment Heart Rate (beats/min) 67  -HM     Pre SpO2 (%) 97  -HM     O2 Delivery Pre Treatment nasal cannula  -     O2 Delivery Intra Treatment nasal cannula  -     Post SpO2 (%) 96  -HM     O2 Delivery Post Treatment nasal cannula  -HM     Pre Patient Position Supine  -     Intra Patient Position Standing  -     Post Patient Position Sitting  -       Row Name 01/01/24 0950          Positioning and Restraints    Pre-Treatment Position in bed  -     Post Treatment Position chair  -HM     In Chair notified  nsg;sitting;reclined;encouraged to call for assist;call light within reach;waffle cushion;legs elevated  -               User Key  (r) = Recorded By, (t) = Taken By, (c) = Cosigned By      Initials Name Provider Type    Amparo Magana, STIVEN Physical Therapist                   Outcome Measures       Row Name 01/01/24 0955          How much help from another person do you currently need...    Turning from your back to your side while in flat bed without using bedrails? 2  -HM     Moving from lying on back to sitting on the side of a flat bed without bedrails? 2  -HM     Moving to and from a bed to a chair (including a wheelchair)? 2  -HM     Standing up from a chair using your arms (e.g., wheelchair, bedside chair)? 2  -HM     Climbing 3-5 steps with a railing? 1  -HM     To walk in hospital room? 2  -     AM-PAC 6 Clicks Score (PT) 11  -     Highest Level of Mobility Goal 4 --> Transfer to chair/commode  -       Row Name 01/01/24 0955 01/01/24 0815       Functional Assessment    Outcome Measure Options AM-PAC 6 Clicks Basic Mobility (PT)  - AM-PAC 6 Clicks Daily Activity (OT)  -              User Key  (r) = Recorded By, (t) = Taken By, (c) = Cosigned By      Initials Name Provider Type    Brigitte Chaney, OT Occupational Therapist     Amparo Arreguin, PT Physical Therapist                                 Physical Therapy Education       Title: PT OT SLP Therapies (In Progress)       Topic: Physical Therapy (In Progress)       Point: Mobility training (Done)       Learning Progress Summary             Patient Acceptance, TB,E, NR,VU by  at 1/1/2024 0955                         Point: Home exercise program (Not Started)       Learner Progress:  Not documented in this visit.              Point: Body mechanics (Done)       Learning Progress Summary             Patient Acceptance, TB,E, NR,VU by  at 1/1/2024 0955                         Point: Precautions (Done)       Learning Progress Summary              Patient Acceptance, TB,E, NR,VU by  at 1/1/2024 0955                                         User Key       Initials Effective Dates Name Provider Type Discipline     09/22/22 -  Amparo Arreguin PT Physical Therapist PT                  PT Recommendation and Plan  Planned Therapy Interventions (PT): balance training, bed mobility training, gait training, home exercise program, neuromuscular re-education, postural re-education, patient/family education, ROM (range of motion), stair training, strengthening, transfer training  Plan of Care Reviewed With: patient  Progress: no change  Outcome Evaluation: PT eval completed. Pt completed a bed to chair transfer with Mod A x2 with BUE support and STS with Max A x2 with BUE support. Mobility limited d/t generalized weakness and edema. Pt demonstrated mobility below baseline function with strength deficits, decreased AROM in BLE, balance deficits, and decreased functional endurance. d/c rec IRF     Time Calculation:   PT Evaluation Complexity  History, PT Evaluation Complexity: 3 or more personal factors and/or comorbidities  Examination of Body Systems (PT Eval Complexity): total of 3 or more elements  Clinical Presentation (PT Evaluation Complexity): evolving  Clinical Decision Making (PT Evaluation Complexity): moderate complexity  Overall Complexity (PT Evaluation Complexity): moderate complexity     PT Charges       Row Name 01/01/24 0956             Time Calculation    Start Time 0825  -HM      PT Received On 01/01/24  -      PT Goal Re-Cert Due Date 01/11/24  -HM         Timed Charges    85014 - PT Therapeutic Activity Minutes 8  -HM         Untimed Charges    PT Eval/Re-eval Minutes 48  -HM         Total Minutes    Timed Charges Total Minutes 8  -HM      Untimed Charges Total Minutes 48  -HM       Total Minutes 56  -HM                User Key  (r) = Recorded By, (t) = Taken By, (c) = Cosigned By      Initials Name Provider Type     Amparo Arreguin, PT  Physical Therapist                  Therapy Charges for Today       Code Description Service Date Service Provider Modifiers Qty    57495224716 HC PT EVAL MOD COMPLEXITY 4 1/1/2024 Amparo Arreguin, PT GP 1    63523863049 HC PT THERAPEUTIC ACT EA 15 MIN 1/1/2024 Amparo Arreguin, PT GP 1            PT G-Codes  Outcome Measure Options: AM-PAC 6 Clicks Basic Mobility (PT)  AM-PAC 6 Clicks Score (PT): 11  AM-PAC 6 Clicks Score (OT): 16  PT Discharge Summary  Anticipated Discharge Disposition (PT): inpatient rehabilitation facility    Amparo Arreguin PT  1/1/2024     Curettage Text: The wound bed was treated with curettage after the biopsy was performed.